# Patient Record
Sex: FEMALE | Race: WHITE | Employment: FULL TIME | URBAN - METROPOLITAN AREA
[De-identification: names, ages, dates, MRNs, and addresses within clinical notes are randomized per-mention and may not be internally consistent; named-entity substitution may affect disease eponyms.]

---

## 2017-05-25 LAB
ANTIBODY SCREEN, EXTERNAL: NORMAL
CHLAMYDIA, EXTERNAL: NORMAL
HBSAG, EXTERNAL: NORMAL
HCT, EXTERNAL: 37.7
HEPATITIS C AB,   EXT: POSITIVE
HGB, EXTERNAL: 13
HIV, EXTERNAL: NORMAL
N. GONORRHEA, EXTERNAL: NORMAL
PLATELET CNT,   EXTERNAL: 238
RUBELLA, EXTERNAL: NORMAL
T. PALLIDUM, EXTERNAL: NORMAL
TYPE, ABO & RH, EXTERNAL: NORMAL
URINALYSIS, EXTERNAL: NORMAL

## 2017-08-16 LAB — CYSTIC FIBROSIS, EXTERNAL: NORMAL

## 2017-09-26 LAB
ANTIBODY SCREEN, EXTERNAL: NORMAL
ANTIBODY SCREEN, EXTERNAL: NORMAL
GTT, 1 HR, GLUCOLA, EXTERNAL: 111
GTT, 1 HR, GLUCOLA, EXTERNAL: 111
HCT, EXTERNAL: 34.2
HGB, EXTERNAL: 11.7
PLATELET CNT,   EXTERNAL: 221

## 2017-11-27 ENCOUNTER — OFFICE VISIT (OUTPATIENT)
Dept: OBGYN CLINIC | Age: 27
End: 2017-11-27

## 2017-11-27 NOTE — PROGRESS NOTES
A user error has taken place: encounter opened in error, closed for administrative reasons. A user error has taken place: encounter opened in error, closed for administrative reasons.

## 2017-11-30 DIAGNOSIS — O09.93 SUPERVISION OF HIGH RISK PREGNANCY IN THIRD TRIMESTER: ICD-10-CM

## 2017-11-30 LAB
AFPT, MATERNAL, EXTERNAL: NORMAL
NT, EXTERNAL: NORMAL

## 2017-12-04 ENCOUNTER — ROUTINE PRENATAL (OUTPATIENT)
Dept: OBGYN CLINIC | Age: 27
End: 2017-12-04

## 2017-12-04 VITALS
WEIGHT: 162.6 LBS | BODY MASS INDEX: 28.81 KG/M2 | SYSTOLIC BLOOD PRESSURE: 104 MMHG | HEIGHT: 63 IN | DIASTOLIC BLOOD PRESSURE: 58 MMHG

## 2017-12-04 DIAGNOSIS — B19.20 HEPATITIS C VIRUS INFECTION WITHOUT HEPATIC COMA, UNSPECIFIED CHRONICITY: ICD-10-CM

## 2017-12-04 DIAGNOSIS — O09.93 SUPERVISION OF HIGH RISK PREGNANCY IN THIRD TRIMESTER: Primary | ICD-10-CM

## 2017-12-04 DIAGNOSIS — Z23 ENCOUNTER FOR IMMUNIZATION: ICD-10-CM

## 2017-12-04 DIAGNOSIS — F19.11 HISTORY OF DRUG ABUSE (HCC): ICD-10-CM

## 2017-12-04 LAB — GRBS, EXTERNAL: NEGATIVE

## 2017-12-04 NOTE — PATIENT INSTRUCTIONS
Week 38 of Your Pregnancy: Care Instructions  Your Care Instructions    Believe it or not, your baby is almost here. You may have ideas about your baby's personality because of how much he or she moves. Or you may have noticed how he or she responds to sounds, warmth, cold, and light. You may even know what kind of music your baby likes. By now, you have a better idea of what to expect during delivery. You may have talked about your birth preferences with your doctor. But even if you want a vaginal birth, it is a good idea to learn about  births.  birth means that your baby is born through a cut (incision) in your lower belly. It is sometimes the best choice for the health of the baby and the mother. This care sheet can help you understand  births. It also gives you information about what to expect after your baby is born. And it helps you understand more about postpartum depression. Follow-up care is a key part of your treatment and safety. Be sure to make and go to all appointments, and call your doctor if you are having problems. It's also a good idea to know your test results and keep a list of the medicines you take. How can you care for yourself at home? Learn about  birth  · Most C-sections are unplanned. They are done because of problems that occur during labor. These problems might include:  ¨ Labor that slows or stops. ¨ High blood pressure or other problems for the mother. ¨ Signs of distress in the baby. These signs may include a very fast or slow heart rate. · Although most mothers and babies do well after , it is major surgery. It has more risks than a vaginal delivery. · In some cases, a planned  may be safer than a vaginal delivery. This may be the case if:  ¨ The mother has a health problem, such as a heart condition. ¨ The baby isn't in a head-down position for delivery. This is called a breech position.   ¨ The uterus has scars from past surgeries. This could increase the chance of a tear in the uterus. ¨ There is a problem with the placenta. ¨ The mother has an infection, such as genital herpes, that could be spread to the baby. ¨ The mother is having twins or more. ¨ The baby weighs 9 to 10 pounds or more. · Because of the risks of , planned C-sections generally should be done only for medical reasons. And a planned  should be done at 39 weeks or later unless there is a medical reason to do it sooner. Know what to expect after delivery, and plan for the first few weeks at home  · You, your baby, and your partner or  will get identification bands. Only people with matching bands can  the baby from the nursery. · You will learn how to feed, diaper, and bathe your baby. And you will learn how to care for the umbilical cord stump. If your baby will be circumcised, you will also learn how to care for that. · Ask people to wait to visit you until you are at home. And ask them to wash their hands before they touch your baby. · Make sure you have another adult in your home for at least 2 or 3 days after the birth. · During the first 2 weeks, limit when friends and family can visit. · Do not allow visitors who have colds or infections. Make sure all visitors are up to date with their vaccinations. Never let anyone smoke around your baby. · Try to nap when the baby naps. Be aware of postpartum depression  · \"Baby blues\" are common for the first 1 to 2 weeks after birth. You may cry or feel sad or irritable for no reason. · For some women, these feelings last longer and are more intense. This is called postpartum depression. · If your symptoms last for more than a few weeks or you feel very depressed, ask your doctor for help. · Postpartum depression can be treated. Support groups and counseling can help. Sometimes medicine can also help. Where can you learn more?   Go to http://martin-andres.info/. Enter B044 in the search box to learn more about \"Week 38 of Your Pregnancy: Care Instructions. \"  Current as of: March 16, 2017  Content Version: 11.4  © 1960-6294 [employer]. Care instructions adapted under license by [employer] (which disclaims liability or warranty for this information). If you have questions about a medical condition or this instruction, always ask your healthcare professional. [address] any warranty or liability for your use of this information.

## 2017-12-04 NOTE — PROGRESS NOTES
Current pregnancy history:    Maged Capellan is a 33 y.o. female who presents for the evaluation of pregnancy. Patient is a 37w5d late transfer. Male fetus   Patient's last menstrual period was 03/15/2017. LMP history:  The date of her LMP is certain. Her last menstrual period was normal and lasted for 4 to 5 days. She was not on the pill at conception. Based on her LMP, her EDC is 17 Her menstrual cycles are regular and occur approximately every 28 days  and range from 3 to 5 days. The last menses lasted the usual number of days. Ultrasound data:  She had an  ultrasound done 2017 which revealed a viable nunez pregnancy with a gestational age of 9 weeks and 6 days giving an [address] of 12/15/17. Records are scanned into patients chart. Pregnancy symptoms:    Since her LMP she has experienced  urinary frequency, breast tenderness, and nausea. She has not been vomiting over the last few weeks. Associated signs and symptoms which she denies: dysuria, discharge, vaginal bleeding. She states she has gained weight:  Approximately 5 pounds over the last few weeks. Relevant past pregnancy history:   She has the following pregnancy history:   Hx of TABx1   She has no history of  delivery. Relevant past medical history:(relevant to this pregnancy): noncontributory. Patient has history of substance abuse, heroin and methamphetamines.   She states she is not on any maintience therapy  She was incarcerated in her first trimester and was transferred to a substance abuse treatment facility  Recently moved back to [facility] and living with family    Problem List  Never Reviewed          Codes Class Noted    Supervision of high risk pregnancy in third trimester ICD-10-CM: O09.93  ICD-9-CM: V23.9  2017    Overview Signed 2017  1:14 PM by April TEJ Bailey     Late transfer  Rh neg-rhogam received 17  Hep C +  Drug abuse-heroin and meth  Declined genetic testing, was too late for tetra  JEREMY consistent w/ LMP                     Pap/Occupational history:  Last pap smear: last year Results: Normal      Her occupation is: not employed. Substance history: negative for alcohol, tobacco and street drugs. Positive for nothing. Exposure history: There are indoor cat/s in the home. The patient was instructed to not change the cat litter. She denies close contact with children on a regular basis. She has had chicken pox or the vaccine in the past.   Patient denies issues with domestic violence. Genetic Screening/Teratology Counseling: (Includes patient, baby's father, or anyone in either family with:)  3.  Patient's age >/= 28 at [facility]?-- no  .   2. Thalassemia ([facility] [place] [address], or  background): MCV<80?--no.     3.  Neural tube defect (meningomyelocele, spina bifida, anencephaly)?--no.   4.  Congenital heart defect?--no.  5.  Down syndrome?--no.   6.  Neel-Sachs (Catholic, [employer])?--no.   7.  Canavan's Disease?--no.   8.  Familial Dysautonomia?--no.   9.  Sickle cell disease or trait ()? --no   The patient has not been tested for sickle trait  10. Hemophilia or other blood disorders?--no. 11.  Muscular dystrophy?--no. 12.  Cystic fibrosis?--no. 13.  Myke's Chorea?--no. 14.  Mental retardation/autism (if yes was person tested for Fragile X)?--no. 15.  Other inherited genetic or chromosomal disorder?--no. 12.  Maternal metabolic disorder (DM, PKU, etc)?--no. 17.  Patient or FOB with a child with a birth defect not listed above?--no.  17a. Patient or FOB with a birth defect themselves?--no. 18.  Recurrent pregnancy loss, or stillbirth?--no. 19.  Any medications since LMP other than prenatal vitamins (include vitamins,  supplements, OTC meds, drugs, alcohol)?--no. 20.  Any other genetic/environmental exposure to discuss?--no. Infection History:  1.   Lives with someone with TB or TB exposed?--no.   2.  Patient or partner has history of genital herpes?--no.  3.  Rash or viral illness since LMP?--no.    4.  History of STD (GC, CT, HPV, syphilis, HIV)? --no   5. Other: OTHER? Obstetric History       T0      L0     SAB0   TAB1   Ectopic0   Molar0   Multiple0   Live Births0       # Outcome Date GA Lbr Robert/2nd Weight Sex Delivery Anes PTL Lv   2 Current            1 TAB                     Past Medical History:   Diagnosis Date    Constipation     GERD (gastroesophageal reflux disease)     Hepatitis C     Intravenous drug abuse complicating pregnancy     Heroin and Meth     No past surgical history on file. Social History     Occupational History    Not on file.      Social History Main Topics    Smoking status: Former Smoker    Smokeless tobacco: Never Used    Alcohol use Not on file    Drug use: Yes     Special: Methamphetamines, Heroin    Sexual activity: Yes     Partners: Male     Birth control/ protection: None     Family History   Problem Relation Age of Onset    Colon Cancer Father        Not on File  Prior to Admission medications    Not on File        Review of Systems: History obtained from the patient  Constitutional: negative for weight loss, fever, night sweats  HEENT: negative for hearing loss, earache, congestion, snoring, sorethroat  CV: negative for chest pain, palpitations, edema  Resp: negative for cough, shortness of breath, wheezing  Breast: negative for breast lumps, nipple discharge, galactorrhea  GI: negative for change in bowel habits, abdominal pain, black or bloody stools  : negative for frequency, dysuria, hematuria, vaginal discharge  MSK: negative for back pain, joint pain, muscle pain  Skin: negative for itching, rash, hives  Neuro: negative for dizziness, headache, confusion, weakness  Psych: negative for anxiety, depression, change in mood  Heme/lymph: negative for bleeding, bruising, pallor    Objective:  Visit Vitals    /58    Ht 5' 3\" (1.6 m)    Wt 162 lb 9.6 oz (73.8 kg)    LMP 03/15/2017    BMI 28.8 kg/m2       Physical Exam:   PHYSICAL EXAMINATION    Constitutional  · Appearance: well-nourished, well developed, alert, in no acute distress    HENT  · Head  · Face: appears normal  · Eyes: appear normal  · Ears: normal  · Mouth: normal  · Lips: no lesions    Neck  · Inspection/Palpation: normal appearance, no masses or tenderness  · Lymph Nodes: no lymphadenopathy present  · Thyroid: gland size normal, nontender, no nodules or masses present on palpation    Chest  · Respiratory Effort: breathing unlabored  · Auscultation: normal breath sounds    Cardiovascular  · Heart:  · Auscultation: regular rate and rhythm without murmur    Breasts  · Inspection of Breasts: breasts symmetrical, no skin changes, no discharge present, nipple appearance normal, no skin retraction present  · Palpation of Breasts and Axillae: no masses present on palpation, no breast tenderness  · Axillary Lymph Nodes: no lymphadenopathy present    Gastrointestinal  · Abdominal Examination: abdomen non-tender to palpation, normal bowel sounds, no masses present  · Liver and spleen: no hepatomegaly present, spleen not palpable  · Hernias: no hernias identified    Genitourinary  · External Genitalia: normal appearance for age, no discharge present, no tenderness present, no inflammatory lesions present, no masses present, no atrophy present  · Vagina: normal vaginal vault without central or paravaginal defects, no discharge present, no inflammatory lesions present, no masses present  · Bladder: non-tender to palpation  · Urethra: appears normal  · Cervix: normal   · Uterus: enlarged, normal shape, soft  · Adnexa: no adnexal tenderness present, no adnexal masses present  · Perineum: perineum within normal limits, no evidence of trauma, no rashes or skin lesions present  · Anus: anus within normal limits, no hemorrhoids present  · Inguinal Lymph Nodes: no lymphadenopathy present    Skin  · General Inspection: no rash, no lesions identified    Neurologic/Psychiatric  · Mental Status:  · Orientation: grossly oriented to person, place and time  · Mood and Affect: mood normal, affect appropriate    Assessment:   Intrauterine pregnancy with the following problems identified:   ED 2018 by D=US  \"Anel\"  Transfer 37 weeks  Hx of heroine and met abuse - s/p penitentiary/rehab. No maintenance currently  Hep C pos  Declined genetic testing per records  Flu vaccine 2018  Rh neg - had rhogam at prior MD office      Plan:     Offered CF testing, CVS, Nuchal Translucency, MSAFP, amnio, and discussed NIPT  Course of pregnancy discussed including visit schedule, routine U/S, glucola testing, etc.  Avoid alcoholic beverages and illicit/recreational drugs use  Take prenatal vitamins or folic acid daily. Hospital and practice style discussed with coverage system. Discussed nutrition, toxoplasmosis precautions, sexual activity, exercise, need for influenza vaccine, environmental and work hazards, travel advice, screen for domestic violence, need for seat belts. Discussed seafood, unpasteurized dairy products, deli meat, artificial sweeteners, and caffeine. Information on prenatal classes/breastfeeding given. Information on circumcision given  Patient encouraged not to smoke. Discussed current prescription drug use. Given medication list.  Discussed the use of over the counter medications and chemicals. Route of delivery discussed, including risks, benefits, and alternatives of  versus repeat LTCS. Pt understands risk of hemorrhage during pregnancy and post delivery and would accept blood products if necessary in life-threatening emergencies  Urine drug screen, US for growth, refer to hepatology - check comp met for liver function, RNA quant  GBS sent  Disc labor prec      Handouts given to pt.

## 2017-12-04 NOTE — PROGRESS NOTES
Problem List  Never Reviewed          Codes Class Noted    Supervision of high risk pregnancy in third trimester ICD-10-CM: O09.93  ICD-9-CM: V23.9  11/30/2017    Overview Signed 11/30/2017  1:14 PM by Jahaira Bailey     Late transfer  Rh neg-rhogam received 9/26/17  Hep C +  Drug abuse-heroin and meth  Declined genetic testing, was too late for tetra  JEREMY consistent w/ LMP

## 2017-12-05 LAB
ALBUMIN SERPL-MCNC: 3.7 G/DL (ref 3.5–5.5)
ALBUMIN/GLOB SERPL: 1.3 {RATIO} (ref 1.2–2.2)
ALP SERPL-CCNC: 126 IU/L (ref 39–117)
ALT SERPL-CCNC: 42 IU/L (ref 0–32)
AMPHETAMINES UR QL SCN: NEGATIVE NG/ML
AST SERPL-CCNC: 32 IU/L (ref 0–40)
BARBITURATES UR QL SCN: NEGATIVE NG/ML
BENZODIAZ UR QL: NEGATIVE NG/ML
BILIRUB SERPL-MCNC: 0.2 MG/DL (ref 0–1.2)
BUN SERPL-MCNC: 7 MG/DL (ref 6–20)
BUN/CREAT SERPL: 11 (ref 9–23)
BZE UR QL: NEGATIVE NG/ML
CALCIUM SERPL-MCNC: 8.6 MG/DL (ref 8.7–10.2)
CANNABINOIDS UR QL SCN: NEGATIVE NG/ML
CHLORIDE SERPL-SCNC: 104 MMOL/L (ref 96–106)
CO2 SERPL-SCNC: 22 MMOL/L (ref 18–29)
CREAT SERPL-MCNC: 0.64 MG/DL (ref 0.57–1)
GFR SERPLBLD CREATININE-BSD FMLA CKD-EPI: 124 ML/MIN/1.73
GFR SERPLBLD CREATININE-BSD FMLA CKD-EPI: 142 ML/MIN/1.73
GLOBULIN SER CALC-MCNC: 2.8 G/DL (ref 1.5–4.5)
GLUCOSE SERPL-MCNC: 88 MG/DL (ref 65–99)
HCV RNA SERPL NAA+PROBE-ACNC: NORMAL IU/ML
HCV RNA SERPL NAA+PROBE-LOG IU: 5.86 LOG10 IU/ML
MDMA UR QL SCN: NEGATIVE NG/ML
METHADONE UR QL SCN: NEGATIVE NG/ML
METHAQUALONE UR QL: NEGATIVE NG/ML
OPIATES UR QL: NEGATIVE NG/ML
PCP UR QL: NEGATIVE NG/ML
POTASSIUM SERPL-SCNC: 4.6 MMOL/L (ref 3.5–5.2)
PROPOXYPH UR QL: NEGATIVE NG/ML
PROT SERPL-MCNC: 6.5 G/DL (ref 6–8.5)
SODIUM SERPL-SCNC: 142 MMOL/L (ref 134–144)
TEST INFORMATION: NORMAL

## 2017-12-06 ENCOUNTER — TELEPHONE (OUTPATIENT)
Dept: OBGYN CLINIC | Age: 27
End: 2017-12-06

## 2017-12-06 DIAGNOSIS — O09.93 SUPERVISION OF HIGH RISK PREGNANCY IN THIRD TRIMESTER: ICD-10-CM

## 2017-12-06 NOTE — TELEPHONE ENCOUNTER
Called and left detailed message on voicemail that I could not remember if we went over this info when she was just here, but that she is far along enough she needs to go online or call the [phone] and register for the hospital.  I also advised patient she could still sign up for classes through the hospital that way.

## 2017-12-07 ENCOUNTER — ROUTINE PRENATAL (OUTPATIENT)
Dept: OBGYN CLINIC | Age: 27
End: 2017-12-07

## 2017-12-07 VITALS — DIASTOLIC BLOOD PRESSURE: 62 MMHG | WEIGHT: 163.4 LBS | BODY MASS INDEX: 28.95 KG/M2 | SYSTOLIC BLOOD PRESSURE: 102 MMHG

## 2017-12-07 DIAGNOSIS — O09.93 SUPERVISION OF HIGH RISK PREGNANCY IN THIRD TRIMESTER: Primary | ICD-10-CM

## 2017-12-07 DIAGNOSIS — O40.3XX0 POLYHYDRAMNIOS AFFECTING PREGNANCY IN THIRD TRIMESTER: ICD-10-CM

## 2017-12-07 DIAGNOSIS — B18.2 CHRONIC HEPATITIS C WITHOUT HEPATIC COMA (HCC): ICD-10-CM

## 2017-12-07 DIAGNOSIS — F19.11 HISTORY OF DRUG ABUSE (HCC): ICD-10-CM

## 2017-12-07 LAB — GP B STREP DNA SPEC QL NAA+PROBE: NEGATIVE

## 2017-12-07 NOTE — PROGRESS NOTES
Baby moving. US today 64%tile, HARSHIL 31cm. Hep C titer elevated, one abnl LFT - will repeat next week at regular visit. Has appt with liver specialist in March - first avail appt.   See MFM tomorrow

## 2017-12-07 NOTE — PROGRESS NOTES
LIMITED OB SCAN  A SINGLE VERTEX 38W1D IUP IS SEEN. FETAL CARDIAC MOTION OBSERVED. LIMITED ANATOMY WAS VISUALIZED AND APPEARS WNL. APPROPRIATE FETAL GROWTH IS SEEN. SIZE = DATES. PLACENTA APPEARS WNL. HARSHIL APPEARS INCREASED AND MEASURES 31CM.

## 2017-12-08 ENCOUNTER — HOSPITAL ENCOUNTER (OUTPATIENT)
Dept: PERINATAL CARE | Age: 27
Discharge: HOME | End: 2017-12-08
Attending: OBSTETRICS & GYNECOLOGY
Payer: COMMERCIAL

## 2017-12-08 ENCOUNTER — ANESTHESIA EVENT (OUTPATIENT)
Dept: LABOR AND DELIVERY | Age: 27
End: 2017-12-08
Payer: COMMERCIAL

## 2017-12-08 ENCOUNTER — ANESTHESIA (OUTPATIENT)
Dept: LABOR AND DELIVERY | Age: 27
End: 2017-12-08
Payer: COMMERCIAL

## 2017-12-08 ENCOUNTER — HOSPITAL ENCOUNTER (INPATIENT)
Age: 27
LOS: 3 days | Discharge: HOME | End: 2017-12-11
Attending: OBSTETRICS & GYNECOLOGY | Admitting: OBSTETRICS & GYNECOLOGY
Payer: COMMERCIAL

## 2017-12-08 PROBLEM — Z34.90 PREGNANCY: Status: ACTIVE | Noted: 2017-12-08

## 2017-12-08 LAB
AMPHET UR QL SCN: NEGATIVE
BARBITURATES UR QL SCN: NEGATIVE
BASOPHILS # BLD: 0 K/UL (ref 0–0.1)
BASOPHILS NFR BLD: 0 % (ref 0–1)
BENZODIAZ UR QL: NEGATIVE
CANNABINOIDS UR QL SCN: NEGATIVE
COCAINE UR QL SCN: NEGATIVE
DRUG SCRN COMMENT,DRGCM: NORMAL
EOSINOPHIL # BLD: 0.1 K/UL (ref 0–0.4)
EOSINOPHIL NFR BLD: 2 % (ref 0–7)
ERYTHROCYTE [DISTWIDTH] IN BLOOD BY AUTOMATED COUNT: 12.5 % (ref 11.5–14.5)
HCT VFR BLD AUTO: 35.8 % (ref 35–47)
HGB BLD-MCNC: 12.6 G/DL (ref 11.5–16)
LYMPHOCYTES # BLD: 2.3 K/UL (ref 0.8–3.5)
LYMPHOCYTES NFR BLD: 32 % (ref 12–49)
MCH RBC QN AUTO: 33.2 PG (ref 26–34)
MCHC RBC AUTO-ENTMCNC: 35.2 G/DL (ref 30–36.5)
MCV RBC AUTO: 94.5 FL (ref 80–99)
METHADONE UR QL: NEGATIVE
MONOCYTES # BLD: 0.6 K/UL (ref 0–1)
MONOCYTES NFR BLD: 8 % (ref 5–13)
NEUTS SEG # BLD: 4.3 K/UL (ref 1.8–8)
NEUTS SEG NFR BLD: 58 % (ref 32–75)
OPIATES UR QL: NEGATIVE
PCP UR QL: NEGATIVE
PLATELET # BLD AUTO: 143 K/UL (ref 150–400)
RBC # BLD AUTO: 3.79 M/UL (ref 3.8–5.2)
WBC # BLD AUTO: 7.4 K/UL (ref 3.6–11)

## 2017-12-08 PROCEDURE — 74011250636 HC RX REV CODE- 250/636: Performed by: ANESTHESIOLOGY

## 2017-12-08 PROCEDURE — 59025 FETAL NON-STRESS TEST: CPT

## 2017-12-08 PROCEDURE — 51702 INSERT TEMP BLADDER CATH: CPT

## 2017-12-08 PROCEDURE — 99285 EMERGENCY DEPT VISIT HI MDM: CPT

## 2017-12-08 PROCEDURE — 76010000392 HC C SECN EA ADDL 0.5 HR: Performed by: OBSTETRICS & GYNECOLOGY

## 2017-12-08 PROCEDURE — 85025 COMPLETE CBC W/AUTO DIFF WBC: CPT | Performed by: OBSTETRICS & GYNECOLOGY

## 2017-12-08 PROCEDURE — 75410000003 HC RECOV DEL/VAG/CSECN EA 0.5 HR: Performed by: OBSTETRICS & GYNECOLOGY

## 2017-12-08 PROCEDURE — 76010000391 HC C SECN FIRST 1 HR: Performed by: OBSTETRICS & GYNECOLOGY

## 2017-12-08 PROCEDURE — 76805 OB US >/= 14 WKS SNGL FETUS: CPT | Performed by: OBSTETRICS & GYNECOLOGY

## 2017-12-08 PROCEDURE — 74011000250 HC RX REV CODE- 250

## 2017-12-08 PROCEDURE — 76060000078 HC EPIDURAL ANESTHESIA: Performed by: OBSTETRICS & GYNECOLOGY

## 2017-12-08 PROCEDURE — 77030032060 HC PWDR HEMSTAT ARISTA ASRB 3GM BARD -C

## 2017-12-08 PROCEDURE — 77030007866 HC KT SPN ANES BBMI -B: Performed by: ANESTHESIOLOGY

## 2017-12-08 PROCEDURE — 74011250636 HC RX REV CODE- 250/636

## 2017-12-08 PROCEDURE — 36415 COLL VENOUS BLD VENIPUNCTURE: CPT | Performed by: OBSTETRICS & GYNECOLOGY

## 2017-12-08 PROCEDURE — 99218 HC RM OBSERVATION: CPT

## 2017-12-08 PROCEDURE — 75410000002 HC LABOR FEE PER 1 HR: Performed by: OBSTETRICS & GYNECOLOGY

## 2017-12-08 PROCEDURE — 74011250636 HC RX REV CODE- 250/636: Performed by: OBSTETRICS & GYNECOLOGY

## 2017-12-08 PROCEDURE — 65270000029 HC RM PRIVATE

## 2017-12-08 PROCEDURE — 80307 DRUG TEST PRSMV CHEM ANLYZR: CPT | Performed by: OBSTETRICS & GYNECOLOGY

## 2017-12-08 RX ORDER — SIMETHICONE 80 MG
80 TABLET,CHEWABLE ORAL
Status: DISCONTINUED | OUTPATIENT
Start: 2017-12-08 | End: 2017-12-11 | Stop reason: HOSPADM

## 2017-12-08 RX ORDER — SODIUM CHLORIDE 0.9 % (FLUSH) 0.9 %
5-10 SYRINGE (ML) INJECTION EVERY 8 HOURS
Status: DISCONTINUED | OUTPATIENT
Start: 2017-12-08 | End: 2017-12-11 | Stop reason: HOSPADM

## 2017-12-08 RX ORDER — ZOLPIDEM TARTRATE 5 MG/1
5 TABLET ORAL
Status: DISCONTINUED | OUTPATIENT
Start: 2017-12-08 | End: 2017-12-11 | Stop reason: HOSPADM

## 2017-12-08 RX ORDER — SODIUM CHLORIDE, SODIUM LACTATE, POTASSIUM CHLORIDE, CALCIUM CHLORIDE 600; 310; 30; 20 MG/100ML; MG/100ML; MG/100ML; MG/100ML
100 INJECTION, SOLUTION INTRAVENOUS CONTINUOUS
Status: DISCONTINUED | OUTPATIENT
Start: 2017-12-08 | End: 2017-12-08

## 2017-12-08 RX ORDER — CEFAZOLIN SODIUM IN 0.9 % NACL 2 G/50 ML
2 INTRAVENOUS SOLUTION, PIGGYBACK (ML) INTRAVENOUS ONCE
Status: COMPLETED | OUTPATIENT
Start: 2017-12-08 | End: 2017-12-08

## 2017-12-08 RX ORDER — SODIUM CHLORIDE 0.9 % (FLUSH) 0.9 %
5-10 SYRINGE (ML) INJECTION EVERY 8 HOURS
Status: DISCONTINUED | OUTPATIENT
Start: 2017-12-09 | End: 2017-12-11 | Stop reason: HOSPADM

## 2017-12-08 RX ORDER — SODIUM CHLORIDE 0.9 % (FLUSH) 0.9 %
5-10 SYRINGE (ML) INJECTION AS NEEDED
Status: DISCONTINUED | OUTPATIENT
Start: 2017-12-08 | End: 2017-12-08

## 2017-12-08 RX ORDER — MORPHINE SULFATE 2 MG/ML
INJECTION, SOLUTION INTRAMUSCULAR; INTRAVENOUS AS NEEDED
Status: DISCONTINUED | OUTPATIENT
Start: 2017-12-08 | End: 2017-12-08 | Stop reason: HOSPADM

## 2017-12-08 RX ORDER — NALBUPHINE HYDROCHLORIDE 10 MG/ML
5 INJECTION, SOLUTION INTRAMUSCULAR; INTRAVENOUS; SUBCUTANEOUS
Status: DISCONTINUED | OUTPATIENT
Start: 2017-12-08 | End: 2017-12-08

## 2017-12-08 RX ORDER — KETOROLAC TROMETHAMINE 30 MG/ML
30 INJECTION, SOLUTION INTRAMUSCULAR; INTRAVENOUS
Status: DISPENSED | OUTPATIENT
Start: 2017-12-08 | End: 2017-12-09

## 2017-12-08 RX ORDER — BUPIVACAINE HYDROCHLORIDE 7.5 MG/ML
INJECTION, SOLUTION EPIDURAL; RETROBULBAR AS NEEDED
Status: DISCONTINUED | OUTPATIENT
Start: 2017-12-08 | End: 2017-12-08 | Stop reason: HOSPADM

## 2017-12-08 RX ORDER — SODIUM CHLORIDE, SODIUM LACTATE, POTASSIUM CHLORIDE, CALCIUM CHLORIDE 600; 310; 30; 20 MG/100ML; MG/100ML; MG/100ML; MG/100ML
100 INJECTION, SOLUTION INTRAVENOUS CONTINUOUS
Status: DISCONTINUED | OUTPATIENT
Start: 2017-12-09 | End: 2017-12-11 | Stop reason: HOSPADM

## 2017-12-08 RX ORDER — KETOROLAC TROMETHAMINE 30 MG/ML
30 INJECTION, SOLUTION INTRAMUSCULAR; INTRAVENOUS
Status: DISCONTINUED | OUTPATIENT
Start: 2017-12-08 | End: 2017-12-08

## 2017-12-08 RX ORDER — MORPHINE SULFATE 0.5 MG/ML
INJECTION, SOLUTION EPIDURAL; INTRATHECAL; INTRAVENOUS AS NEEDED
Status: DISCONTINUED | OUTPATIENT
Start: 2017-12-08 | End: 2017-12-08 | Stop reason: HOSPADM

## 2017-12-08 RX ORDER — SODIUM CHLORIDE, SODIUM LACTATE, POTASSIUM CHLORIDE, CALCIUM CHLORIDE 600; 310; 30; 20 MG/100ML; MG/100ML; MG/100ML; MG/100ML
125 INJECTION, SOLUTION INTRAVENOUS CONTINUOUS
Status: DISCONTINUED | OUTPATIENT
Start: 2017-12-08 | End: 2017-12-11 | Stop reason: HOSPADM

## 2017-12-08 RX ORDER — OXYTOCIN 10 [USP'U]/ML
INJECTION, SOLUTION INTRAMUSCULAR; INTRAVENOUS AS NEEDED
Status: DISCONTINUED | OUTPATIENT
Start: 2017-12-08 | End: 2017-12-08 | Stop reason: HOSPADM

## 2017-12-08 RX ORDER — SODIUM CHLORIDE, SODIUM LACTATE, POTASSIUM CHLORIDE, CALCIUM CHLORIDE 600; 310; 30; 20 MG/100ML; MG/100ML; MG/100ML; MG/100ML
125 INJECTION, SOLUTION INTRAVENOUS CONTINUOUS
Status: DISCONTINUED | OUTPATIENT
Start: 2017-12-08 | End: 2017-12-08

## 2017-12-08 RX ORDER — NALOXONE HYDROCHLORIDE 0.4 MG/ML
0.4 INJECTION, SOLUTION INTRAMUSCULAR; INTRAVENOUS; SUBCUTANEOUS AS NEEDED
Status: DISCONTINUED | OUTPATIENT
Start: 2017-12-08 | End: 2017-12-11 | Stop reason: HOSPADM

## 2017-12-08 RX ORDER — OXYTOCIN/RINGER'S LACTATE 20/1000 ML
125-500 PLASTIC BAG, INJECTION (ML) INTRAVENOUS ONCE
Status: ACTIVE | OUTPATIENT
Start: 2017-12-08 | End: 2017-12-09

## 2017-12-08 RX ORDER — SODIUM CHLORIDE 0.9 % (FLUSH) 0.9 %
5-10 SYRINGE (ML) INJECTION AS NEEDED
Status: DISCONTINUED | OUTPATIENT
Start: 2017-12-08 | End: 2017-12-11 | Stop reason: HOSPADM

## 2017-12-08 RX ORDER — HYDROCODONE BITARTRATE AND ACETAMINOPHEN 7.5; 325 MG/1; MG/1
1 TABLET ORAL
Status: DISCONTINUED | OUTPATIENT
Start: 2017-12-08 | End: 2017-12-11 | Stop reason: HOSPADM

## 2017-12-08 RX ORDER — ZOLPIDEM TARTRATE 5 MG/1
5 TABLET ORAL
Status: DISCONTINUED | OUTPATIENT
Start: 2017-12-08 | End: 2017-12-08

## 2017-12-08 RX ORDER — SODIUM CHLORIDE 0.9 % (FLUSH) 0.9 %
5-10 SYRINGE (ML) INJECTION EVERY 8 HOURS
Status: DISCONTINUED | OUTPATIENT
Start: 2017-12-08 | End: 2017-12-08

## 2017-12-08 RX ORDER — NALBUPHINE HYDROCHLORIDE 10 MG/ML
5 INJECTION, SOLUTION INTRAMUSCULAR; INTRAVENOUS; SUBCUTANEOUS
Status: ACTIVE | OUTPATIENT
Start: 2017-12-08 | End: 2017-12-09

## 2017-12-08 RX ORDER — ONDANSETRON 2 MG/ML
INJECTION INTRAMUSCULAR; INTRAVENOUS AS NEEDED
Status: DISCONTINUED | OUTPATIENT
Start: 2017-12-08 | End: 2017-12-08 | Stop reason: HOSPADM

## 2017-12-08 RX ORDER — IBUPROFEN 600 MG/1
600 TABLET ORAL
Status: DISCONTINUED | OUTPATIENT
Start: 2017-12-08 | End: 2017-12-11 | Stop reason: HOSPADM

## 2017-12-08 RX ORDER — HYDROMORPHONE HYDROCHLORIDE 1 MG/ML
1 INJECTION, SOLUTION INTRAMUSCULAR; INTRAVENOUS; SUBCUTANEOUS
Status: DISCONTINUED | OUTPATIENT
Start: 2017-12-08 | End: 2017-12-11 | Stop reason: HOSPADM

## 2017-12-08 RX ADMIN — CEFAZOLIN 2 G: 1 INJECTION, POWDER, FOR SOLUTION INTRAMUSCULAR; INTRAVENOUS; PARENTERAL at 18:53

## 2017-12-08 RX ADMIN — MORPHINE SULFATE 250 MCG: 0.5 INJECTION, SOLUTION EPIDURAL; INTRATHECAL; INTRAVENOUS at 18:48

## 2017-12-08 RX ADMIN — ONDANSETRON 4 MG: 2 INJECTION INTRAMUSCULAR; INTRAVENOUS at 19:08

## 2017-12-08 RX ADMIN — OXYTOCIN 20 UNITS: 10 INJECTION, SOLUTION INTRAMUSCULAR; INTRAVENOUS at 19:46

## 2017-12-08 RX ADMIN — OXYTOCIN 40 UNITS: 10 INJECTION, SOLUTION INTRAMUSCULAR; INTRAVENOUS at 19:08

## 2017-12-08 RX ADMIN — SODIUM CHLORIDE, POTASSIUM CHLORIDE, SODIUM LACTATE AND CALCIUM CHLORIDE: 600; 310; 30; 20 INJECTION, SOLUTION INTRAVENOUS at 19:46

## 2017-12-08 RX ADMIN — SODIUM CHLORIDE, POTASSIUM CHLORIDE, SODIUM LACTATE AND CALCIUM CHLORIDE: 600; 310; 30; 20 INJECTION, SOLUTION INTRAVENOUS at 19:08

## 2017-12-08 RX ADMIN — SODIUM CHLORIDE, POTASSIUM CHLORIDE, SODIUM LACTATE AND CALCIUM CHLORIDE: 600; 310; 30; 20 INJECTION, SOLUTION INTRAVENOUS at 18:42

## 2017-12-08 RX ADMIN — KETOROLAC TROMETHAMINE 30 MG: 30 INJECTION, SOLUTION INTRAMUSCULAR at 23:59

## 2017-12-08 RX ADMIN — MORPHINE SULFATE 2 MG: 2 INJECTION, SOLUTION INTRAMUSCULAR; INTRAVENOUS at 19:10

## 2017-12-08 RX ADMIN — BUPIVACAINE HYDROCHLORIDE 1.7 ML: 7.5 INJECTION, SOLUTION EPIDURAL; RETROBULBAR at 18:48

## 2017-12-08 NOTE — IP AVS SNAPSHOT
[address] 
[phone] Patient: Tim Mann MRN: [id number] :1990 My Medications TAKE these medications as instructed Instructions Each Dose to Equal  
 Morning Noon Evening Bedtime  
 ibuprofen 800 mg tablet Commonly known as:  MOTRIN Your last dose was: Your next dose is: Take 1 Tab by mouth every six (6) hours as needed for Pain. 800 mg  
    
   
   
   
  
 PNV No12-Iron-FA-DSS-OM-3 29 mg iron-1 mg -50 mg Cpkd Your last dose was: Your next dose is: Take  by mouth. Where to Get Your Medications These medications were sent to [facility]/pharmacy #[id number]- [address] AT [place] [address] [place] [id number] Phone:  [phone]  
  ibuprofen 800 mg tablet

## 2017-12-08 NOTE — ANESTHESIA PREPROCEDURE EVALUATION
Anesthetic History   No history of anesthetic complications            Review of Systems / Medical History  Patient summary reviewed and pertinent labs reviewed    Pulmonary  Within defined limits                 Neuro/Psych   Within defined limits           Cardiovascular                  Exercise tolerance: >4 METS     GI/Hepatic/Renal     GERD: well controlled      Liver disease    Comments: Hep C Endo/Other  Within defined limits           Other Findings              Physical Exam    Airway  Mallampati: II  TM Distance: 4 - 6 cm  Neck ROM: normal range of motion        Cardiovascular  Regular rate and rhythm,  S1 and S2 normal,  no murmur, click, rub, or gallop  Rhythm: regular  Rate: normal         Dental  No notable dental hx       Pulmonary                 Abdominal         Other Findings            Anesthetic Plan    ASA: 2  Anesthesia type: spinal      Post-op pain plan if not by surgeon: intrathecal opiates      Anesthetic plan and risks discussed with: Patient

## 2017-12-08 NOTE — PROGRESS NOTES
1611 Patient arrived to unit from [facility]. Patient denies felling contractions or pain, SOB, chest pain, headaches, N/V, visual disturbance, epigastric pain, vaginal bleeding or discharge. Will continue to monitor. 56 Dr Randy Person at the bedside assessing patient. SVE closed cervix. MD discussed plan of care with patient. Agreed to eat dinner and monitor over night. Received orders for IV, saline lock, lab draw and continuous monitoring. 1754 Deceleration noted on EFM. Dr Randy Person enter Call . Patient agree. Will continue to monitor. Patient repositioned to right side lying. 1905 Bedside and Verbal shift change report given to Miriam Crews RN (oncoming nurse) by Jenny Barger RN (offgoing nurse). Report included the following information SBAR, Kardex and MAR.

## 2017-12-08 NOTE — H&P
History & Physical    Name: Unique Bautista MRN: [id number]  SSN: [id number]    YOB: 1990  Age: 33 y.o. Sex: female        Subjective:     Estimated Date of Delivery: 17  OB History      Para Term  AB Living    2 0   1 0    SAB TAB Ectopic Molar Multiple Live Births     1    0          MsIsabel Berrios is admitted with pregnancy at 38w2d for  Section. Prenatal course was complicated by suspicious fetal surveillance. She had 8/10 BPP today but with late decels on NST. Transferred to L&D and prolonged monitoring shows again prolonged late decels. Please see prenatal records for details. Past Medical History:   Diagnosis Date    Constipation     GERD (gastroesophageal reflux disease)     Hepatitis C     Intravenous drug abuse complicating pregnancy     Heroin and Meth     No past surgical history on file.  @  Social History     Social History    Marital status: SINGLE     Spouse name: N/A    Number of children: N/A    Years of education: N/A     Occupational History    Not on file. Social History Main Topics    Smoking status: Former Smoker    Smokeless tobacco: Never Used    Alcohol use Not on file    Drug use: Yes     Special: Methamphetamines, Heroin    Sexual activity: Yes     Partners: Male     Birth control/ protection: None     Other Topics Concern    Not on file     Social History Narrative     Family History   Problem Relation Age of Onset    Colon Cancer Father        No Known Allergies  Prior to Admission medications    Medication Sig Start Date End Date Taking? Authorizing Provider   PNV No12-Iron-FA-DSS-OM-3 29 mg iron-1 mg -50 mg CPKD Take  by mouth. Yes Historical Provider        Review of Systems: A comprehensive review of systems was negative except for that written in the HPI.     Objective:     Vitals:  Vitals:    17 1644   BP: 113/72   Pulse: 86   Resp: 16   Temp: 98.2 °F (36.8 °C)        Physical Exam:  Chest: clear  Heart: RRR  Abdomen: Gravid  Closed/Thick/High  Membranes:  Intact  Fetal Heart Rate: Late deceleration     Prenatal Labs:   Lab Results   Component Value Date/Time    Rubella, External immune 2017    GrBStrep, External Negative 2017    HBsAg, External neg 2017    HIV, External neg 2017    Gonorrhea, External neg 2017    Chlamydia, External neg 2017        Assessment/Plan:     Plan: Admit for  Section. With suspicious surveillance and a very unfavorable cervix will proceed with c section. Pt agrees. See prenatal labs.     Signed By:  Dameon Carpio MD     2017

## 2017-12-08 NOTE — ANESTHESIA PROCEDURE NOTES
Spinal Block    Start time: 12/8/2017 6:45 PM  End time: 12/8/2017 6:48 PM  Performed by: Desiree Choudhury  Authorized by: Desiree Choudhury     Pre-procedure:   Indications: at surgeon's request and primary anesthetic  Preanesthetic Checklist: patient identified, risks and benefits discussed, anesthesia consent and timeout performed    Timeout Time: 18:45          Spinal Block:   Patient Position:  Seated  Prep Region:  Lumbar  Prep: chlorhexidine      Location:  L3-4  Technique:  Single shot        Needle:   Needle Type:  Pencan  Needle Gauge:  25 G  Attempts:  1      Events: CSF confirmed, no blood with aspiration and no paresthesia        Assessment:  Insertion:  Uncomplicated  Patient tolerance:  Patient tolerated the procedure well with no immediate complications

## 2017-12-08 NOTE — IP AVS SNAPSHOT
Jason Idol 
 
 
 [address] [facility] 
[phone] Patient: Agueda Reed MRN: [id number] :1990 About your hospitalization You were admitted on:  2017 You last received care in the:  [facility] You were discharged on:  2017 Why you were hospitalized Your primary diagnosis was:  Not on File Your diagnoses also included:  Pregnancy Things You Need To Do (next 8 weeks) Follow up with Darryle Aquas, MD  
  
Phone:  [phone] Where:  [address], [place] [address], [address] Thursday Dec 14, 2017 ESTABLISHED PATIENT with Darryle Aquas, MD at  1:40 PM  
Where: [place] ([address]) Discharge Orders None A check louis indicates which time of day the medication should be taken. My Medications TAKE these medications as instructed Instructions Each Dose to Equal  
 Morning Noon Evening Bedtime  
 ibuprofen 800 mg tablet Commonly known as:  MOTRIN Your last dose was: Your next dose is: Take 1 Tab by mouth every six (6) hours as needed for Pain. 800 mg  
    
   
   
   
  
 PNV No12-Iron-FA-DSS-OM-3 29 mg iron-1 mg -50 mg Cpkd Your last dose was: Your next dose is: Take  by mouth. Where to Get Your Medications These medications were sent to [facility]/pharmacy #[id number]- [address] AT [address], [address] [place] [address] Phone:  [phone]  
  ibuprofen 800 mg tablet Discharge Instructions POST DELIVERY DISCHARGE INSTRUCTIONS Name: Agueda Reed YOB: 1990 Primary Diagnosis: Active Problems: 
  Pregnancy (2017) General:  
 
Diet/Diet Restrictions: Eight 8-ounce glasses of fluid daily (water, juices); avoid excessive caffeine intake. Meals/snacks as desired which are high in fiber and carbohydrates and low in fat and cholesterol. Physical Activity / Restrictions / Safety:  
 
Avoid heavy lifting, no more that 8 lbs. For 2-3 weeks; No driving while taking narcotic pain medication. Post  patients should not drive until pain free. No intercourse 4-6 weeks, no douching or tampon use. May resume exercise in 6 weeks. Discharge Instructions/Special Treatment/Home Care Needs:  
 
Continue prenatal vitamins. Continue to use squirt bottle with warm water on your episiotomy after each bathroom use until bleeding stops. If steri-strips applied to your incision, remove in 7 days. Take stool softeners daily. Call your doctor for the following:  
 
Fever over 101 degrees by mouth. Vaginal bleeding heavier than a normal menstrual period or lost larger than a golf ball. Red streaks or increased swelling of legs, painful red streaks on your breast. 
Painful urination, or increased pain, redness or discharge with your incision. Pain Management:  
 
Pain Management:  
Take Acetaminophen (Tylenol) or Ibuprofen (Advil, Motrin), as directed for pain. Use a warm Sitz bath 3 times daily to relieve episiotomy or hemorrhoidal discomfort. Heating pad to  incision as needed. For hemorrhoidal discomfort, use Tucks and Anusol cream as needed and directed. Follow-Up Care:  
 
Appointment with MD: Follow-up Appointments Procedures  FOLLOW UP VISIT Appointment in: 6 Weeks Standing Status:   Standing Number of Occurrences:   1 Order Specific Question:   Appointment in Answer:   6 Weeks Telephone number: [phone] Signed By: Keshia Perea MD                                                                                                   Date: 2017 Time: 8:59 AM 
 
 
  
  
  
[facility] Announcement We are excited to announce that we are making your provider's discharge notes available to you in twenty5media. You will see these notes when they are completed and signed by the physician that discharged you from your recent hospital stay. If you have any questions or concerns about any information you see in twenty5media, please call the Health Information Department where you were seen or reach out to your Primary Care Provider for more information about your plan of care. Introducing [facility] & HE[facility]! Dear Jarrod Hoffman: Thank you for requesting a twenty5media account. Our records indicate that you already have an active twenty5media account. You can access your account anytime at [email or web address] Did you know that you can access your hospital and ER discharge instructions at any time in twenty5media? You can also review all of your test results from your hospital stay or ER visit. Additional Information If you have questions, please visit the Frequently Asked Questions section of the twenty5media website at [email or web address] Remember, twenty5media is NOT to be used for urgent needs. For medical emergencies, dial 911. Now available from your iPhone and Android! Providers Seen During Your Hospitalization Provider Specialty Primary office phone Gio Quiñones MD Obstetrics & Gynecology [phone] Immunizations Administered for This Admission Name Date Rho(D) Immune Globulin - IM 12/9/2017 Your Primary Care Physician (PCP) Primary Care Physician Office Phone Office Fax Stacy Og [phone] You are allergic to the following No active allergies Recent Documentation Breastfeeding? OB Status Smoking Status Unknown Recent pregnancy Former Smoker Emergency Contacts Name Discharge Info Relation Home Work Mobile Kemi Elena CAREGIVER [3] Mother [14]   [phone] Patient Belongings The following personal items are in your possession at time of discharge: 
  Dental Appliances: None         Home Medications: None   Jewelry: Earrings  Clothing: Footwear, Jacket/Coat, Shirt, Socks, Undergarments    Other Valuables: [employer] Please provide this summary of care documentation to your next provider. Signatures-by signing, you are acknowledging that this After Visit Summary has been reviewed with you and you have received a copy. Patient Signature:  ____________________________________________________________ Date:  ____________________________________________________________  
  
[facility] Provider Signature:  ____________________________________________________________ Date:  ____________________________________________________________

## 2017-12-09 LAB
BASOPHILS # BLD: 0 K/UL (ref 0–0.1)
BASOPHILS NFR BLD: 0 % (ref 0–1)
EOSINOPHIL # BLD: 0.1 K/UL (ref 0–0.4)
EOSINOPHIL NFR BLD: 1 % (ref 0–7)
ERYTHROCYTE [DISTWIDTH] IN BLOOD BY AUTOMATED COUNT: 12.7 % (ref 11.5–14.5)
HCT VFR BLD AUTO: 32 % (ref 35–47)
HGB BLD-MCNC: 11 G/DL (ref 11.5–16)
LYMPHOCYTES # BLD: 2 K/UL (ref 0.8–3.5)
LYMPHOCYTES NFR BLD: 20 % (ref 12–49)
MCH RBC QN AUTO: 32.9 PG (ref 26–34)
MCHC RBC AUTO-ENTMCNC: 34.4 G/DL (ref 30–36.5)
MCV RBC AUTO: 95.8 FL (ref 80–99)
MONOCYTES # BLD: 0.6 K/UL (ref 0–1)
MONOCYTES NFR BLD: 6 % (ref 5–13)
NEUTS SEG # BLD: 7.1 K/UL (ref 1.8–8)
NEUTS SEG NFR BLD: 73 % (ref 32–75)
PLATELET # BLD AUTO: 130 K/UL (ref 150–400)
RBC # BLD AUTO: 3.34 M/UL (ref 3.8–5.2)
WBC # BLD AUTO: 9.7 K/UL (ref 3.6–11)

## 2017-12-09 PROCEDURE — 77030036554

## 2017-12-09 PROCEDURE — 74011250636 HC RX REV CODE- 250/636: Performed by: ANESTHESIOLOGY

## 2017-12-09 PROCEDURE — 36415 COLL VENOUS BLD VENIPUNCTURE: CPT | Performed by: OBSTETRICS & GYNECOLOGY

## 2017-12-09 PROCEDURE — 74011250637 HC RX REV CODE- 250/637: Performed by: OBSTETRICS & GYNECOLOGY

## 2017-12-09 PROCEDURE — 65270000029 HC RM PRIVATE

## 2017-12-09 PROCEDURE — 85025 COMPLETE CBC W/AUTO DIFF WBC: CPT | Performed by: OBSTETRICS & GYNECOLOGY

## 2017-12-09 PROCEDURE — 85461 HEMOGLOBIN FETAL: CPT | Performed by: OBSTETRICS & GYNECOLOGY

## 2017-12-09 PROCEDURE — 74011250636 HC RX REV CODE- 250/636: Performed by: OBSTETRICS & GYNECOLOGY

## 2017-12-09 PROCEDURE — 86900 BLOOD TYPING SEROLOGIC ABO: CPT | Performed by: OBSTETRICS & GYNECOLOGY

## 2017-12-09 PROCEDURE — 3E0234Z INTRODUCTION OF SERUM, TOXOID AND VACCINE INTO MUSCLE, PERCUTANEOUS APPROACH: ICD-10-PCS | Performed by: OBSTETRICS & GYNECOLOGY

## 2017-12-09 RX ADMIN — IBUPROFEN 600 MG: 600 TABLET, FILM COATED ORAL at 15:39

## 2017-12-09 RX ADMIN — HUMAN RHO(D) IMMUNE GLOBULIN 0.3 MG: 300 INJECTION, SOLUTION INTRAMUSCULAR at 13:04

## 2017-12-09 RX ADMIN — IBUPROFEN 600 MG: 600 TABLET, FILM COATED ORAL at 23:55

## 2017-12-09 RX ADMIN — KETOROLAC TROMETHAMINE 30 MG: 30 INJECTION, SOLUTION INTRAMUSCULAR at 09:30

## 2017-12-09 NOTE — PROGRESS NOTES
SBAR IN Report: Mother    Verbal report received from [address] [place] [address] [phone] (full name & credentials) on this patient, who is now being transferred from Labor and Delivery Unit (unit) for routine progression of care. The patient is wearing a green \"Anesthesia-Duramorph\" band. Report consisted of patient's Situation, Background, Assessment and Recommendations (SBAR).  ID bands were compared with the identification form, and verified with the patient and transferring nurse. Information from the SBAR and [facility] and the [facility] Report was reviewed with the transferring nurse; opportunity for questions and clarification provided.

## 2017-12-09 NOTE — PROGRESS NOTES
1840: To L&D OR 1 with patient and JULIUS Parks RN  1616: Bedside SBAR report received from JULIUS Parks RN  8798: TRANSFER - OUT REPORT:    Verbal report given to KARIE Machuca RN(name) on Edy Carp  being transferred to MIU(unit) for routine progression of care       Report consisted of patients Situation, Background, Assessment and   Recommendations(SBAR). Information from the following report(s) SBAR, Kardex, OR Summary, Intake/Output and MAR was reviewed with the receiving nurse. Lines:   Peripheral IV 12/08/17 Right Wrist (Active)   Site Assessment Clean, dry, & intact 12/8/2017  9:23 PM   Phlebitis Assessment 0 12/8/2017  9:23 PM   Infiltration Assessment 0 12/8/2017  9:23 PM   Dressing Status Clean, dry, & intact 12/8/2017  9:23 PM   Dressing Type Transparent;Tape 12/8/2017  9:23 PM   Hub Color/Line Status Pink 12/8/2017  9:23 PM        Opportunity for questions and clarification was provided.       Patient transported with:   Registered Nurse

## 2017-12-09 NOTE — LACTATION NOTE
This note was copied from a baby's chart. Discussed with mother her plan for feeding. Reviewed the benefits of exclusive breast milk feeding during the hospital stay. Informed her of the risks of using formula to supplement in the first few days of life as well as the benefits of successful breast milk feeding; referred her to the Breastfeeding booklet about this information. She acknowledges understanding of information reviewed and states that it is her plan to try to breastfeed her infant. Will support her choice and offer additional information as needed. Reviewed breastfeeding basics:  How milk is made and normal  breastfeeding behaviors discussed. Supply and demand,  stomach size, early feeding cues, skin to skin bonding with comfortable positioning and baby led latch-on reviewed. How to identify signs of successful breastfeeding sessions reviewed; education on assymetrical latch, signs of effective latching vs shallow, in-effective latching, normal  feeding frequency and duration and expected infant output discussed. Normal course of breastfeeding discussed including the AAP's recommendation that children receive exclusive breast milk feedings for the first six months of life with breast milk feedings to continue through the first year of life and/or beyond as complimentary table foods are added. Breastfeeding Booklet and Warm line information provided with discussion. Discussed typical  weight loss and the importance of pediatrician appointment within 24-48 hours of discharge, at 2 weeks of life and normalcy of requesting pediatric weight checks as needed in between visits. Reviewed breastfeeding techniques and positions with mother until found a position she was most comfortable with. Reminded mother of early feeding cues and that breast fed infants should be fed on demand without time restriction on the first breast until the infant seems satisfied.  Then the second breast is offered. Advised mother to awaken  to feed if three hours have passed since baby last ate. Will continue to monitor mother's progress with breastfeeding and offer assistance at any time. Hand Expression Education:  Mom taught how to manually hand express her colostrum. Emphasized the importance of providing infant with valuable colostrum as infant rests skin to skin at breast.  Aware to avoid extended periods of non-feeding. Aware to offer 10-20+ drops of colostrum every 2-3 hours until infant is latching and nursing effectively. Taught the rationale behind this low tech but highly effective evidence based practice. Pt will successfully establish breastfeeding by feeding in response to early feeding cues   or wake every 3h, will obtain deep latch, and will keep log of feedings/output. Taught to BF at hunger cues and or q 2-3 hrs and to offer 10-20 drops of hand expressed colostrum at any non-feeds.       Breast Assessment  Left Breast: Small   Left Nipple: Everted, Short  Right Breast: Small   Right Nipple: Everted, Short  Breast- Feeding Assessment  Attends Breast-Feeding Classes: No  Breast-Feeding Experience: No  Breast Trauma/Surgery: No  Type/Quality: [facility]  Lactation Consultant Visits  Breast-Feedings: Fair  Mother/Infant Observation  Mother Observation: Alignment, Breast comfortable, Close hold  Infant Observation: Latches nipple and aereolae, Lips flanged, lower, Lips flanged, upper, Opens mouth  LATCH Documentation  Latch: Grasps breast, tongue down, lips flanged, rhythmic sucking  Audible Swallowing: A few with stimulation  Type of Nipple: Everted (after stimulation) (short)  Comfort (Breast/Nipple): Soft/non-tender  Hold (Positioning): Full assist, teach one side, mother does other, staff holds  LATCH Score: 8  Baby latches well for a few minutes, rhythmic suckling noted after 15 minutes of expressing drops with baby licking at breast. Baby is spitty at times, mother easily frustrated. Mother able to hand express many drops easily to baby.

## 2017-12-09 NOTE — PROGRESS NOTES
Post-Operative Day Number 1 Progress Note    Patient doing well post-op day 1 from  delivery without significant complaints. Pain controlled on current medication. Voiding without difficulty, normal lochia. Vitals:  Patient Vitals for the past 8 hrs:   BP Temp Pulse Resp   17 0425 118/72 98.1 °F (36.7 °C) 94 18     Temp (24hrs), Av.8 °F (36.6 °C), Min:97.5 °F (36.4 °C), Max:98.2 °F (36.8 °C)      Vital signs stable, afebrile. Exam:  Patient without distress. Abdomen soft, fundus firm at level of umbilicus, nontender. Incision dry and clean without erythema. Lower extremities are negative for swelling, cords or tenderness. Labs:   Recent Results (from the past 24 hour(s))   DRUG SCREEN, URINE    Collection Time: 17  5:44 PM   Result Value Ref Range    AMPHETAMINES NEGATIVE  NEG      BARBITURATES NEGATIVE  NEG      BENZODIAZEPINES NEGATIVE  NEG      COCAINE NEGATIVE  NEG      METHADONE NEGATIVE  NEG      OPIATES NEGATIVE  NEG      PCP(PHENCYCLIDINE) NEGATIVE  NEG      THC (TH-CANNABINOL) NEGATIVE  NEG      Drug screen comment (NOTE)    CBC WITH AUTOMATED DIFF    Collection Time: 17  5:54 PM   Result Value Ref Range    WBC 7.4 3.6 - 11.0 K/uL    RBC 3.79 (L) 3.80 - 5.20 M/uL    HGB 12.6 11.5 - 16.0 g/dL    HCT 35.8 35.0 - 47.0 %    MCV 94.5 80.0 - 99.0 FL    MCH 33.2 26.0 - 34.0 PG    MCHC 35.2 30.0 - 36.5 g/dL    RDW 12.5 11.5 - 14.5 %    PLATELET 852 (L) 457 - 400 K/uL    NEUTROPHILS 58 32 - 75 %    LYMPHOCYTES 32 12 - 49 %    MONOCYTES 8 5 - 13 %    EOSINOPHILS 2 0 - 7 %    BASOPHILS 0 0 - 1 %    ABS. NEUTROPHILS 4.3 1.8 - 8.0 K/UL    ABS. LYMPHOCYTES 2.3 0.8 - 3.5 K/UL    ABS. MONOCYTES 0.6 0.0 - 1.0 K/UL    ABS. EOSINOPHILS 0.1 0.0 - 0.4 K/UL    ABS.  BASOPHILS 0.0 0.0 - 0.1 K/UL   CBC WITH AUTOMATED DIFF    Collection Time: 17  4:23 AM   Result Value Ref Range    WBC 9.7 3.6 - 11.0 K/uL    RBC 3.34 (L) 3.80 - 5.20 M/uL    HGB 11.0 (L) 11.5 - 16.0 g/dL    HCT 32.0 (L) 35.0 - 47.0 %    MCV 95.8 80.0 - 99.0 FL    MCH 32.9 26.0 - 34.0 PG    MCHC 34.4 30.0 - 36.5 g/dL    RDW 12.7 11.5 - 14.5 %    PLATELET 747 (L) 085 - 400 K/uL    NEUTROPHILS 73 32 - 75 %    LYMPHOCYTES 20 12 - 49 %    MONOCYTES 6 5 - 13 %    EOSINOPHILS 1 0 - 7 %    BASOPHILS 0 0 - 1 %    ABS. NEUTROPHILS 7.1 1.8 - 8.0 K/UL    ABS. LYMPHOCYTES 2.0 0.8 - 3.5 K/UL    ABS. MONOCYTES 0.6 0.0 - 1.0 K/UL    ABS. EOSINOPHILS 0.1 0.0 - 0.4 K/UL    ABS. BASOPHILS 0.0 0.0 - 0.1 K/UL       Assessment and Plan:  Patient appears to be having uncomplicated post- course. Continue routine post-op care and maternal education.

## 2017-12-09 NOTE — OP NOTES
[facility]   [address] [facility], [address]   OP NOTE       Name:  César Rojas   MR#:  [id number]   :  1990   Account #:  [de-identified]    Surgery Date:  2017   Date of Adm:  2017       PREOPERATIVE DIAGNOSES:   1. Intrauterine pregnancy at 38 weeks. 2. Suspicious fetal surveillance. 3. Noninducible cervix. POSTOPERATIVE DIAGNOSES:   1. Intrauterine pregnancy at 38 weeks. 2. Suspicious fetal surveillance. 3. Noninducible cervix. 4. Delivered. PROCEDURES PERFORMED: Primary low transverse    section. : Delgado Brown MD    ANESTHESIA: Spinal.    INDICATIONS: The patient was seen at the Maternal Fetal Medicine   office today for fetal surveillance due to polyhydramnios. The   biophysical profile was 8/10. However, on NST she had some   prolonged late decelerations with spontaneous contractions. She was   transferred to labor and delivery for prolonged monitoring and again   had long, approximately 3-minute late decelerations with slow recovery   with spontaneous contractions. Cervix is long and closed. It was   elected to proceed with  section for delivery due to suspicious   fetal surveillance, essentially a positive contraction stress test and a   noninducible cervix. ESTIMATED BLOOD LOSS: 800 mL. SPECIMENS REMOVED: To Pathology, none. FINDINGS: Beola Mikayla live male infant, Apgars were 8 and 9. DESCRIPTION OF PROCEDURE: The patient was taken to the   operating room and placed on the table in supine position with a left   lateral tilt. Following assurance of adequate spinal anesthesia, a low   transverse skin incision was made. This was carried down to the   fascia, which was incised transversely, and the rectus muscles were   bluntly and sharply removed from the overlying fascia. Peritoneal   cavity was entered and the bladder flap was taken down off the lower   uterine segment.  Uterus was scored in a transverse fashion, entered in   the midline. Clear fluid was noted. The incision was extended with the   's fingers. Vertex was then delivered through the incision. Mouth and nose were suctioned. There was a nuchal cord x1 which   was reduced. Remainder of the infant was delivered. Cord was   clamped and cut. The infant was handed off to pediatric nursing in   attendance. The placenta was manually removed. The uterus was   exteriorized. The cut edges of the myometrium were grasped with Jailene Bis-  Glenn clamps and closed with running locking 0 Vicryl after cleaning the   anterior of the uterus. There was a small amount of bleeding in the   midline of the uterine incision. This was controlled with 2 figure-of-eight   0 Vicryl sutures. Inspection showed good hemostasis and the uterus   was returned to the abdominal cavity. The abdominal cavity was   lavaged with saline and again, there was some bleeding in the midline   and this was controlled with a third figure-of-eight suture. Extensive   observation showed good hemostasis at this time. Inspection in the   subcutaneous area showed some oozing at the inferior portion of the   rectus muscles were taken off the fascia. This was controlled with   cautery and Selina powder. There was good hemostasis. Fascia was   then closed with a running 0 PDS. The subcutaneous tissues were   lavaged with saline. Inspection showed good hemostasis and the   subcu tissues were reapproximated with 3-0 plain gut, and then the   skin was closed with 3-0 Monocryl in a subcuticular fashion. Steri-  Strips and a pressure dressing were applied. Sponge, needle and   instrument counts were correct. Estimated blood loss was 800 mL.         Onofre Whipple MD      [address] / CD   D:  12/08/2017   19:50   T:  12/09/2017   07:59   Job #:  [id number]

## 2017-12-09 NOTE — ROUTINE PROCESS
Bedside and Verbal shift change report given to LETTY Machuca RN (oncoming nurse) by Victoriano Rivera RN (offgoing nurse). Report included the following information SBAR, Kardex, Intake/Output, MAR and Recent Results.

## 2017-12-09 NOTE — LACTATION NOTE
This note was copied from a baby's chart. Went in to visit with mother, mother states baby is very sleepy, just had a feeding where she express 20 drops, mother to call [facility] when she is ready for next feeding.

## 2017-12-09 NOTE — PROGRESS NOTES
Bedside shift change report given to Abbi Zheng (oncoming nurse) by Rufina (offgoing nurse). Report included the following information SBAR and MAR.

## 2017-12-09 NOTE — ANESTHESIA POSTPROCEDURE EVALUATION
Post-Anesthesia Evaluation and Assessment    Patient: Timbo Jones MRN: [id number]  SSN: [id number]    YOB: 1990  Age: 33 y.o. Sex: female       Cardiovascular Function/Vital Signs  Visit Vitals    /67    Pulse 86    Temp 36.4 °C (97.5 °F)    Resp 12    SpO2 95%       Patient is status post spinal anesthesia for Procedure(s):   SECTION. Nausea/Vomiting: None    Postoperative hydration reviewed and adequate. Pain:  Pain Scale 1: Numeric (0 - 10) (17)  Pain Intensity 1: 0 (17)   Managed    Neurological Status:   Neuro (WDL): Exceptions to WDL (17)  Neuro  LLE Motor Response: Numbness (17)  RLE Motor Response: Numbness (17)   At baseline    Mental Status and Level of Consciousness: Arousable    Pulmonary Status:   O2 Device: Room air (17)   Adequate oxygenation and airway patent    Complications related to anesthesia: None    Post-anesthesia assessment completed.  No concerns    Signed By: Kae Griffith MD     2017

## 2017-12-09 NOTE — L&D DELIVERY NOTE
OPERATIVE NOTE    Preop Dx: IUP 38 weeks                   Suspicious fetal surveillance     Postop Dx:  Same/delivered    Procedure: primary LTCS    Op:  Raymond    Anes: spinal    EBL:  476LR    Complications:  none

## 2017-12-10 LAB
ABO + RH BLD: NORMAL
BLD PROD TYP BPU: NORMAL
BPU ID: NORMAL
FETAL SCREEN,FMHS: NORMAL
STATUS OF UNIT,%ST: NORMAL
UNIT DIVISION, %UDIV: 0
WEAK D AG RBC QL: NORMAL

## 2017-12-10 PROCEDURE — 65270000029 HC RM PRIVATE

## 2017-12-10 PROCEDURE — 74011250637 HC RX REV CODE- 250/637: Performed by: OBSTETRICS & GYNECOLOGY

## 2017-12-10 RX ORDER — DOCUSATE SODIUM 100 MG/1
100 CAPSULE, LIQUID FILLED ORAL
Status: DISCONTINUED | OUTPATIENT
Start: 2017-12-10 | End: 2017-12-11 | Stop reason: HOSPADM

## 2017-12-10 RX ADMIN — SIMETHICONE CHEW TAB 80 MG 80 MG: 80 TABLET ORAL at 12:27

## 2017-12-10 RX ADMIN — DOCUSATE SODIUM 100 MG: 100 CAPSULE, LIQUID FILLED ORAL at 12:27

## 2017-12-10 RX ADMIN — IBUPROFEN 600 MG: 600 TABLET, FILM COATED ORAL at 19:42

## 2017-12-10 RX ADMIN — MUPIROCIN: 20 OINTMENT TOPICAL at 12:27

## 2017-12-10 RX ADMIN — SIMETHICONE CHEW TAB 80 MG 80 MG: 80 TABLET ORAL at 17:45

## 2017-12-10 RX ADMIN — IBUPROFEN 600 MG: 600 TABLET, FILM COATED ORAL at 07:42

## 2017-12-10 RX ADMIN — DOCUSATE SODIUM 100 MG: 100 CAPSULE, LIQUID FILLED ORAL at 17:45

## 2017-12-10 RX ADMIN — IBUPROFEN 600 MG: 600 TABLET, FILM COATED ORAL at 13:41

## 2017-12-10 NOTE — PROGRESS NOTES
Post-Operative Day Number 2 Progress Note    Patient doing well post-op day 2 from  delivery without significant complaints. Pain controlled on current medication. Voiding without difficulty, normal lochia. Vitals:  Patient Vitals for the past 8 hrs:   BP Temp Pulse Resp   12/10/17 0632 98/58 98.4 °F (36.9 °C) 76 16     Temp (24hrs), Av.4 °F (36.9 °C), Min:98.1 °F (36.7 °C), Max:98.6 °F (37 °C)      Vital signs stable, afebrile. Exam:  Patient without distress. Abdomen soft, fundus firm at level of umbilicus, nontender. Incision dry and clean without erythema. Lower extremities are negative for swelling, cords or tenderness. Labs: No results found for this or any previous visit (from the past 24 hour(s)). Assessment and Plan:  Patient appears to be having uncomplicated post- course. Continue routine post-op care and maternal education.

## 2017-12-10 NOTE — PROGRESS NOTES
Bedside shift change report given to Thomas Trujillo (oncoming nurse) by Eugenie Castro (offgoing nurse). Report included the following information SBAR and MAR.

## 2017-12-11 VITALS
OXYGEN SATURATION: 95 % | HEART RATE: 78 BPM | DIASTOLIC BLOOD PRESSURE: 64 MMHG | SYSTOLIC BLOOD PRESSURE: 107 MMHG | TEMPERATURE: 98.2 F | RESPIRATION RATE: 16 BRPM

## 2017-12-11 PROCEDURE — 74011250637 HC RX REV CODE- 250/637: Performed by: OBSTETRICS & GYNECOLOGY

## 2017-12-11 RX ORDER — IBUPROFEN 800 MG/1
800 TABLET ORAL
Qty: 60 TAB | Refills: 0 | Status: SHIPPED | OUTPATIENT
Start: 2017-12-11 | End: 2018-01-17

## 2017-12-11 RX ADMIN — IBUPROFEN 600 MG: 600 TABLET, FILM COATED ORAL at 11:46

## 2017-12-11 RX ADMIN — DOCUSATE SODIUM 100 MG: 100 CAPSULE, LIQUID FILLED ORAL at 11:46

## 2017-12-11 RX ADMIN — IBUPROFEN 600 MG: 600 TABLET, FILM COATED ORAL at 05:36

## 2017-12-11 NOTE — PROGRESS NOTES
12/11/17 1:02 PM  CM met with TRUPTI and her mother, [facility] ([phone]) to complete initial assessment and to begin discharge planning. Demographics were reviewed and confirmed. TRUPTI lives with her grandmother, Tomás Franklin; TRUPTI's mother lives close and will provide transportation home at discharge. TRUPTI's grandmother also provides transportation to [place] when needed. Family and friends live locally and are a great support for TRUPTI. [facility] has already met with TRUPTI regarding Medicaid, as she will need Medicaid for the baby and for herself, as [employer] ends this month due to her age. TRUPTI is breastfeeding and has a pump to use at home. Patient has car seat, crib, clothing, and other necessary supplies. Pediatric care will be provided [facility]; the baby's appointment is scheduled for Tuesday at 1:30 PM.  TRUPTI does not work and plans to stay home with baby. MOB and CM discussed TRUPTI's history of substance use. TRUPTI noted that she has been sober off and on for a while and that getting sober \"has been the hardest thing I'll ever do. \"  TRUPTI stated that she had been sober for 7 months, had a 2 week relapse, went to [facility] (due to being violated on her probation for testing positive), and found out she was pregnant. She was incarcerated for the first trimester and then court ordered to treatment in [place] at [facility], a rehabilitation home for pregnant substance addicted women. She left this program on November 17th and immediately joined [facility] for post rehab support. Her family is very aware of her substance use history and supportive. Her and baby's UDS screens were both negative; meconium pending for the baby and CM will monitor. Care Management Interventions  PCP Verified by CM:  Yes  Mode of Transport at Discharge: Self  Transition of Care Consult (CM Consult): Discharge Planning  Current Support Network: [place] (Lives with grandmother)  Confirm Follow Up Transport: Family  Plan discussed with Pt/Family/Caregiver: Yes  Discharge Location  Discharge Placement: Home with outpatient services  TATYANA Edwards

## 2017-12-11 NOTE — ROUTINE PROCESS
Bedside and Verbal shift change report given to Barbara العراقي RN (oncoming nurse) by Enma Padilla RN (offgoing nurse). Report included the following information SBAR, Kardex, Intake/Output and MAR.

## 2017-12-11 NOTE — LACTATION NOTE
This note was copied from a baby's chart. Went in to visit with mother, mother states that baby just fed for 10 minutes then took formula after. Mother states that nurse last night discussed adding supplementation because baby's weight loss is 9.4%. Discussed with mother to option to add pumping as well and then supplementing with EBM. Mother states she does not want to pump now but will add pumping when she gets home if she is still having to supplement. Discussed the importance of adding pumping for the stimulation if baby is being supplemented to help with milk coming in. Mother agrees. Mother encouraged to call [phone] when she feeds again before discharge for latch assessment. Reviewed breastfeeding basics:  How milk is made and normal  breastfeeding behaviors discussed. Supply and demand,  stomach size, early feeding cues, skin to skin bonding with comfortable positioning and baby led latch-on reviewed. How to identify signs of successful breastfeeding sessions reviewed; education on assymetrical latch, signs of effective latching vs shallow, in-effective latching, normal  feeding frequency and duration and expected infant output discussed. Normal course of breastfeeding discussed including the AAP's recommendation that children receive exclusive breast milk feedings for the first six months of life with breast milk feedings to continue through the first year of life and/or beyond as complimentary table foods are added. Breastfeeding Booklet and Warm line information provided with discussion. Discussed typical  weight loss and the importance of pediatrician appointment within 24-48 hours of discharge, at 2 weeks of life and normalcy of requesting pediatric weight checks as needed in between visits.

## 2017-12-11 NOTE — DISCHARGE SUMMARY
Obstetrical Discharge Summary     Name: Ciaran Reddy MRN: [id number]  SSN: [id number]    YOB: 1990  Age: 33 y.o. Sex: female      Admit Date: 2017    Discharge Date: 2017     Admitting Physician: Zuleika Nur MD     Attending Physician:  Peter Melo MD     Admission Diagnoses: Pregnancy;Pregnancy;Pregnancy    Discharge Diagnoses:   Information for the patient's :  Danielle Ceja, Male [[id number]]   Delivery of a 7 lb 4.1 oz (3.29 kg) male infant via , Low Transverse on 2017 at 7:06 PM  by . Apgars were 8 and 9. Additional Diagnoses:   Hospital Problems  Date Reviewed: 2017          Codes Class Noted POA    Pregnancy ICD-10-CM: Z34.90  ICD-9-CM: V22.2  2017 Unknown             Lab Results   Component Value Date/Time    Rubella, External immune 2017    GrBStrep, External Negative 2017       Hospital Course:  Admitted for NRFS at [facility] after referral for poly at term. FHR suspicious in L&D and pt underwent LTCS. Benign pp course    Patient Instructions:   Current Discharge Medication List      START taking these medications    Details   ibuprofen (MOTRIN) 800 mg tablet Take 1 Tab by mouth every six (6) hours as needed for Pain. Qty: 60 Tab, Refills: 0         CONTINUE these medications which have NOT CHANGED    Details   PNV No12-Iron-FA-DSS-OM-3 29 mg iron-1 mg -50 mg CPKD Take  by mouth. Reference my discharge instructions.     Follow-up Appointments   Procedures    FOLLOW UP VISIT Appointment in: 6 Weeks     Standing Status:   Standing     Number of Occurrences:   1     Order Specific Question:   Appointment in     Answer:   6 Weeks        Signed By:  Peter Melo MD     2017

## 2017-12-11 NOTE — PROGRESS NOTES
Post-Operative  Day 3    Serjio Goode         Information for the patient's newbornSrinivas Rose, Male [[id number]]   , Low Transverse   Patient doing well without significant complaint. Tolerating diet, passing flatus, voiding and ambulating without difficulty    Vitals:  Visit Vitals    /64 (BP 1 Location: Left arm, BP Patient Position: At rest)    Pulse 78    Temp 98.2 °F (36.8 °C)    Resp 16    LMP 03/15/2017    SpO2 95%    Breastfeeding Unknown     Temp (24hrs), Av.2 °F (36.8 °C), Min:98.2 °F (36.8 °C), Max:98.2 °F (36.8 °C)        Exam:        Patient without distress. Abdomen, bowel sounds present, soft, expected tenderness, fundus firm                Wound incision clean, dry and intact               Lower extremities are negative for swelling, cords or tenderness. Labs:   Lab Results   Component Value Date/Time    WBC 9.7 2017 04:23 AM    WBC 7.4 2017 05:54 PM    HGB 11.0 2017 04:23 AM    HGB 12.6 2017 05:54 PM    HCT 32.0 2017 04:23 AM    HCT 35.8 2017 05:54 PM    PLATELET 394  04:23 AM    PLATELET 783  05:54 PM    Hgb, External 11.7 2017    Hgb, External 13.0 2017    Hct, External 34.2 2017    Hct, External 37.7 2017    Platelet cnt., External 221 2017    Platelet cnt., External 238 2017       No results found for this or any previous visit (from the past 24 hour(s)). Assessment: Post-Op day 3, doing well    Plan:   1. Discharge home today  2. Follow up in office in 6 weeks with Roger Kawasaki, MD  3. Post partum activity/wound care advised, diet as tolerated  4.  Discharge Medications: ibuprofen, percocet and medications prior to admission

## 2017-12-11 NOTE — DISCHARGE INSTRUCTIONS
POST DELIVERY DISCHARGE INSTRUCTIONS    Name: Yan Javed  YOB: 1990  Primary Diagnosis: Active Problems:    Pregnancy (2017)        General:     Diet/Diet Restrictions:  Eight 8-ounce glasses of fluid daily (water, juices); avoid excessive caffeine intake. Meals/snacks as desired which are high in fiber and carbohydrates and low in fat and cholesterol. Physical Activity / Restrictions / Safety:     Avoid heavy lifting, no more that 8 lbs. For 2-3 weeks; No driving while taking narcotic pain medication. Post  patients should not drive until pain free. No intercourse 4-6 weeks, no douching or tampon use. May resume exercise in 6 weeks. Discharge Instructions/Special Treatment/Home Care Needs:     Continue prenatal vitamins. Continue to use squirt bottle with warm water on your episiotomy after each bathroom use until bleeding stops. If steri-strips applied to your incision, remove in 7 days. Take stool softeners daily. Call your doctor for the following:     Fever over 101 degrees by mouth. Vaginal bleeding heavier than a normal menstrual period or lost larger than a golf ball. Red streaks or increased swelling of legs, painful red streaks on your breast.  Painful urination, or increased pain, redness or discharge with your incision. Pain Management:     Pain Management:   Take Acetaminophen (Tylenol) or Ibuprofen (Advil, Motrin), as directed for pain. Use a warm Sitz bath 3 times daily to relieve episiotomy or hemorrhoidal discomfort. Heating pad to  incision as needed. For hemorrhoidal discomfort, use Tucks and Anusol cream as needed and directed.     Follow-Up Care:     Appointment with MD:   Follow-up Appointments   Procedures    FOLLOW UP VISIT Appointment in: 6 Weeks     Standing Status:   Standing     Number of Occurrences:   1     Order Specific Question:   Appointment in     Answer:   6 Weeks     Telephone number: [phone]    Signed By: Tiara Felix MD                                                                                                   Date: 12/11/2017 Time: 8:59 AM

## 2017-12-11 NOTE — PROGRESS NOTES
Pt off unit in stable condition via wheelchair with volunteers for discharge home per Dr. Tina Fang. Pt is to follow-up in 6 weeks and is aware. Pt denies any HA, Dizziness, N/V or pain at this time. Infant in car seat with mother.

## 2018-01-17 ENCOUNTER — OFFICE VISIT (OUTPATIENT)
Dept: OBGYN CLINIC | Age: 28
End: 2018-01-17

## 2018-01-17 VITALS
HEIGHT: 63 IN | WEIGHT: 142.8 LBS | BODY MASS INDEX: 25.3 KG/M2 | DIASTOLIC BLOOD PRESSURE: 60 MMHG | SYSTOLIC BLOOD PRESSURE: 108 MMHG

## 2018-01-17 DIAGNOSIS — N94.89 SUPPRESSION OF MENSES: ICD-10-CM

## 2018-01-17 PROBLEM — O09.93 SUPERVISION OF HIGH RISK PREGNANCY IN THIRD TRIMESTER: Status: RESOLVED | Noted: 2017-11-30 | Resolved: 2017-12-01

## 2018-01-17 NOTE — PROGRESS NOTES
[facility]  OFFICE PROCEDURE PROGRESS NOTE        Chart reviewed for the following:   Jahaira CONTRERAS, have reviewed the History, Physical and updated the Allergic reactions for Veronica GONZALEZ [address] performed immediately prior to start of procedure:   Jahaira CONTRERAS, have performed the following reviews on Nathalie Malin prior to the start of the procedure:            * Patient was identified by name and date of birth   * Agreement on procedure being performed was verified  * Risks and Benefits explained to the patient  * Procedure site verified and marked as necessary  * Patient was positioned for comfort  * Consent was signed and verified     Time: 2:05pm      Date of procedure: 2018    Procedure performed by:  Kim Harris MD    Patient assisted by: self    How tolerated by patient: tolerated the procedure well with no complications    Post Procedural Pain Scale: 0 - No Hurt    Comments: none    Nathalie Malin is a ,  33 y.[facility]. female [facility] whose No LMP recorded. Patient is not currently having periods (Reason: Breastfeeding). was on  presents for office insertion of an [address] sub-dermal contraceptive implant. She has had an opportunity to read the [facility] [address] \"Patient Labeling and Consent Form\". We counseled Roopa Frazier about the insertion and removal procedures as well as potential side-effects, benefits and risks. She state she had no further questions and signed the consent form. She has been using abstinence to the present time. She confirmed that she has no allergies to Betadine or Xylocaine. She reclined on the examination table in the supine position with her left arm flexed at the elbow and externally rotated. The insertion site was identified and marked approximately 6-8 cm proximal to the elbow crease at the inner side of the upper arm overlying the groove between the biceps and triceps muscles.  A second louis was placed 6-8 cm above the first louis. The insertion site was cleansed with Betadine antiseptic. Approximately 5 ml of 2% xylocaine without epinephrine were injected just under the skin along the planned insertion canal. The NEXPLANON sterile applicator was carefully removed from its blister pack and kept sterile. I removed the needle cap. I visually verified the presence of NEXPLANON inside the needle tip. The skin at the insertion site was then stretched by my thumb and index finger. I then inserted the needle tip through the skin at the appropriate angle to the skin surface, just until the skin has been penetrated. The needle was gently inserted to its full length. The slider was then pushed down and then moved back until it stopped. I then removed the needle and palpated the implant in the appropriate location. The patient also palpated the implant in place. Both Veronica and I were able to confirm the presence of the Acquanetta Aland in its subdermal location by palpation. I placed a small adhesive bandage over the insertion site then wrapped a pressure bandage with sterile gauze. The patient User Card and Patient Chart Label were filled in. She was given the User Card for her records after explaining it to her in detail. I stressed to her that she must have the Acquanetta Aland removed before three years from today's date. She was then given the Personal Calendar (Bleeding Diary) with instructions in it's use. The patient received Nexplanon lot number [id number].

## 2018-01-17 NOTE — PROGRESS NOTES
Postpartum evaluation    Carly Mdcuffie is a 33 y.o. female who presents for a postpartum exam.     She is now six weeks post primary  section for suspicious fetal surveillance. Her baby is doing well. She has had no menses since delivery. She has had the following significant problems since her delivery: none    The patient is breast and bottle feeding without difficulty. The patient would like to use Nexplanon     She is currently taking: PNVs.     She is due for her next AE in 4 months.      Visit Vitals    /60 (BP 1 Location: Right arm, BP Patient Position: Sitting)    Ht 5' 3\" (1.6 m)    Wt 142 lb 12.8 oz (64.8 kg)    Breastfeeding Yes    BMI 25.3 kg/m2       PHYSICAL EXAMINATION    Constitutional  · Appearance: well-nourished, well developed, alert, in no acute distress    HENT  · Head and Face: appears normal    Neck  · Inspection/Palpation: normal appearance, no masses or tenderness  · Lymph Nodes: no lymphadenopathy present  · Thyroid: gland size normal, nontender, no nodules or masses present on palpation    Breasts  · Inspection of Breasts: breasts symmetrical, no skin changes, no discharge present, nipple appearance normal, no skin retraction present  · Palpation of Breasts and Axillae: no masses present on palpation, no breast tenderness  · Axillary Lymph Nodes: no lymphadenopathy present    Gastrointestinal  · Abdominal Examination: abdomen non-tender to palpation, normal bowel sounds, no masses present  · Liver and spleen: no hepatomegaly present, spleen not palpable  · Hernias: no hernias identified    Genitourinary  · External Genitalia: normal appearance for age, no discharge present, no tenderness present, no inflammatory lesions present, no masses present, no atrophy present  · Vagina: normal vaginal vault without central or paravaginal defects, no discharge present, no inflammatory lesions present, no masses present  · Bladder: non-tender to palpation  · Urethra: appears normal  · Cervix: normal   · Uterus: normal size, shape and consistency  · Adnexa: no adnexal tenderness present, no adnexal masses present  · Perineum: perineum within normal limits, no evidence of trauma, no rashes or skin lesions present  · Anus: anus within normal limits, no hemorrhoids present  · Inguinal Lymph Nodes: no lymphadenopathy present    Skin  · General Inspection: no rash, no lesions identified    Neurologic/Psychiatric  · Mental Status:  · Orientation: grossly oriented to person, place and time  · Mood and Affect: mood normal, affect appropriate    Assessment:  Normal postpartum check  Hep C pos  Plan:  RTO for AE.   Place nexplanon today

## 2018-01-17 NOTE — PATIENT INSTRUCTIONS
Nutrition for Breastfeeding Mothers: Care Instructions  Your Care Instructions    When a woman breastfeeds her baby, she needs more nutrients to keep herself healthy and to make the baby's milk. Breastfeeding helps build the bond between you and your baby. It gives your baby excellent health benefits. A healthy diet includes eating a variety of foods from the basic food groups: grains, vegetables, fruits, milk and milk products (such as cheese and yogurt), and meat and dried beans. Eating well during breastfeeding will ensure that you stay healthy and your baby grows and develops normally. Follow-up care is a key part of your treatment and safety. Be sure to make and go to all appointments, and call your doctor if you are having problems. It's also a good idea to know your test results and keep a list of the medicines you take. How can you care for yourself at home? · Include 3 to 4 cups of nonfat or low-fat milk or milk products in your diet every day. These include:  ¨ Milk (8 ounces equals 1 cup). ¨ Ice cream (1½ cups equals 1 cup of milk). ¨ Cheese (1½ ounces of cheese equals 1 cup). ¨ Yogurt (8 ounces equals 1 cup). · Eat at least 7 ounces of grains, such as cereals, breads, crackers, rice, or pasta, every day. One ounce is about 1 slice of bread, 1 cup of breakfast cereal, or ½ cup of cooked rice, cereal, or pasta. · Eat 3 cups of vegetables each day. Choices include:  ¨ Dark-green vegetables such as broccoli and spinach. ¨ Orange vegetables such as carrots and sweet potatoes. ¨ Dried beans (such as dale and kidney beans) and peas (such as lentils). · Every day, eat 2 cups of fresh, frozen, or canned fruit. · Eat 6½ ounces each day of protein, such as chicken, fish, lean meat, eggs, peanut butter, dried beans and peas, nuts, and seeds. One egg, 1 tablespoon of peanut butter, or ½ ounce of nuts or seeds equals 1 ounce of protein. A ½ cup of cooked beans equals 2 ounces of protein.   · Drink plenty of fluids, enough so that your urine is light yellow or clear like water. If you have kidney, heart, or liver disease and have to limit fluids, talk with your doctor before you increase the amount of fluids you drink. · Limit caffeine products, such as coffee, tea, chocolate, and some sodas. Caffeine can pass to your baby through breast milk. It may cause fussiness and sleep problems in babies. · Your doctor may recommend a vitamin supplement. Take it as recommended. · Consider joining a breastfeeding support group. These are offered at many hospitals and birthing centers by nurses, nurse-midwives, or lactation consultants. When should you call for help? Watch closely for changes in your health, and be sure to contact your doctor if you have any problems. Where can you learn more? Go to http://martin-andres.info/. Enter P234 in the search box to learn more about \"Nutrition for Breastfeeding Mothers: Care Instructions. \"  Current as of: March 16, 2017  Content Version: 11.4  © 9048-1808 [employer]. Care instructions adapted under license by [employer] (which disclaims liability or warranty for this information). If you have questions about a medical condition or this instruction, always ask your healthcare professional. [address] any warranty or liability for your use of this information.

## 2018-03-13 ENCOUNTER — TELEPHONE (OUTPATIENT)
Dept: HEMATOLOGY | Age: 28
End: 2018-03-13

## 2018-03-13 NOTE — TELEPHONE ENCOUNTER
Patient confirmed appointment for 3/16/18. Informed to bring insurance card and current medications.

## 2018-03-16 ENCOUNTER — OFFICE VISIT (OUTPATIENT)
Dept: HEMATOLOGY | Age: 28
End: 2018-03-16

## 2018-03-16 VITALS
DIASTOLIC BLOOD PRESSURE: 59 MMHG | WEIGHT: 142.6 LBS | SYSTOLIC BLOOD PRESSURE: 108 MMHG | HEIGHT: 63 IN | HEART RATE: 83 BPM | BODY MASS INDEX: 25.27 KG/M2 | TEMPERATURE: 97 F | OXYGEN SATURATION: 100 %

## 2018-03-16 DIAGNOSIS — B18.2 CHRONIC HEPATITIS C WITHOUT HEPATIC COMA (HCC): Primary | ICD-10-CM

## 2018-03-16 PROBLEM — Z34.90 PREGNANCY: Status: RESOLVED | Noted: 2017-12-08 | Resolved: 2018-03-16

## 2018-03-16 NOTE — PROGRESS NOTES
70 Antonieta Jorgensen MD, [address], [place], [place]       Presley Lopez, ZUHAIR Manzo, DARBY Mendez, ACNP-BC   Justus Monsalve, ZUHAIR Cano, ZUHAIR Gant [facility] [place] 136    at [address], [address] Chad Malagon  22.    [phone]    FAX: [address] [place] [address], [address]    [phone]    FAX: [phone]         Patient Care Team:  Antonella Canela MD as PCP - General (Obstetrics & Gynecology)      Problem List  Date Reviewed: 3/16/2018          Codes Class Noted    Chronic hepatitis C without hepatic coma [facility]) ICD-10-CM: B18.2  ICD-9-CM: 070.54  3/16/2018                The physicians listed above have asked me to see Sarah Henson in consultation regarding chronic HCV and its management. All medical records sent by the referring physicians were reviewed     The patient is a 33 y.o.  female who was noted to have abnormalities in liver chemistries tested positive for chronic HCV in 6/2016. Risk factors for acquiring HCV are IV drug use in 5509-3647. There was no history of acute incteric hepatitis at the time of these risk factors. Imaging of the liver was not performed. An assessment of liver fibrosis with biopsy or elastography has not been performed. The patient has never received treatment for chronic HCV. The most recent laboratory studies are not available. The patient has no symptoms which can be attributed to the liver disorder. The patient has not experienced fatigue, pain in the right side over the liver,     The patient completes all daily activities without any functional limitations.       ALLERGIES  No Known Allergies    MEDICATIONS  Current Outpatient Prescriptions   Medication Sig    etonogestrel (NEXPLANON) 68 mg impl by SubDERmal route.  PNV No12-Iron-FA-DSS-OM-3 29 mg iron-1 mg -50 mg CPKD Take  by mouth. No current facility-administered medications for this visit. SYSTEM REVIEW NOT RELATED TO LIVER DISEASE OR REVIEWED ABOVE:  Constitution systems: Negative for fever, chills, weight gain, weight loss. Eyes: Negative for visual changes. ENT: Negative for sore throat, painful swallowing. Respiratory: Negative for cough, hemoptysis, SOB. Cardiology: Negative for chest pain, palpitations. GI:  Negative for constipation or diarrhea. : Negative for urinary frequency, dysuria, hematuria, nocturia. Skin: Negative for rash. Hematology: Negative for easy bruising, blood clots. Musculo-skelatal: Negative for back pain, muscle pain, weakness. Neurologic: Negative for headaches, dizziness, vertigo, memory problems not related to HE. Psychology: Negative for anxiety, depression. FAMILY HISTORY:  The father is alive and healthy. The mother is alive and healthy. There is no family history of liver disease. SOCIAL HISTORY:  The patient has never been . The patient has 1 child. The patient currently currently smokes 2 cigarettes daily. The patient has never consumed significant amounts of alcohol. The patient currently works part time as a student in drug adiction. PHYSICAL EXAMINATION:  Visit Vitals    /59 (BP 1 Location: Left arm, BP Patient Position: Sitting)    Pulse 83    Temp 97 °F (36.1 °C) (Tympanic)    Ht 5' 3\" (1.6 m)    Wt 142 lb 9.6 oz (64.7 kg)    SpO2 100%    BMI 25.26 kg/m2     General: No acute distress. Eyes: Sclera anicteric. ENT: No oral lesions. Thyroid normal.  Nodes: No adenopathy. Skin: No spider angiomata. No jaundice. No palmar erythema. Respiratory: Lungs clear to auscultation. Cardiovascular: Regular heart rate. No murmurs.   No JVD.  Abdomen: Soft non-tender. Liver size normal to percussion/palpation. Spleen not palpable. No obvious ascites. Extremities: No edema. No muscle wasting. No gross arthritic changes. Neurologic: Alert and oriented. Cranial nerves grossly intact. No asterixis. LABORATORY STUDIES:  Liver [place] of [address] & Units 3/16/2018   WBC 3.4 - 10.8 x10E3/uL 6.8   ANC 1.4 - 7.0 x10E3/uL 4.0   HGB 11.1 - 15.9 g/dL 13.2    - 379 x10E3/uL 248   AST 0 - 40 IU/L 21   ALT 0 - 32 IU/L 25   Alk Phos 39 - 117 IU/L 67   Bili, Total 0.0 - 1.2 mg/dL 0.4   Bili, Direct 0.00 - 0.40 mg/dL 0.12   Albumin 3.5 - 5.5 g/dL 4.2   BUN 6 - 20 mg/dL 11   Creat 0.57 - 1.00 mg/dL 0.77   Na 134 - 144 mmol/L 143   K 3.5 - 5.2 mmol/L 4.2   Cl 96 - 106 mmol/L 103   CO2 18 - 29 mmol/L 25   Glucose 65 - 99 mg/dL 80     SEROLOGIES:  Serologies Latest Ref Rng & Units 3/16/2018 12/4/2017   Hep A Ab, Total Negative Positive (A)    Hep B Surface Ag Negative Negative    Hep B Core Ab, Total Negative Negative    Hep B Surface AB QL  Reactive    HCV RT-PCR, Quant IU/mL  653532   HCV Log10 log10 IU/mL  5.855     LIVER HISTOLOGY:  Not available or performed    ENDOSCOPIC PROCEDURES:  Not available or performed    RADIOLOGY:  Not available or performed    OTHER TESTING:  Not available or performed    ASSESSMENT AND PLAN:  Chronic HCV of unclear severity. Liver function is normal.  The platelet count is normal.      Have performed laboratory testing to monitor liver function and degree of liver injury. This included BMP, hepatic panel, CBC with platelet count,     Will perform and/or review results of HCV viral load and HCV genotype to define the specific treatment and duration of treatment that will be required. Will perform serologic and virologic studies to assess for other causes of chronic liver disease. Will perform imaging of the liver with ultrasound.       The need to perform an assessment of liver fibrosis was discussed with the patient. The Fibroscan can assess liver fibrosis and determine if a patient has advanced fibrosis or cirrhosis without the need for liver biopsy. The Fibroscan is currently available at liver [place]. This will be performed at the next office visit. The patient has not previously been treated for HCV. Discussed the treatment alternatives. The SVR/cure rate for HCV now exceeds 90% with just oral anti-viral therapy and no interferon injections or significant side effects for most patients with HCV. The specific treatment is dependent upon genotype, viral load and histology. The patient was directed to continue all current medications at the current dosages. There are no contraindications for the patient to take any medications that are necessary for treatment of other medical issues. The patient was counseled regarding alcohol consumption. The patient was counseled regarding use of illicit drugs and the risk of reinfection with HCV. She recently delivered her first baby 3 months ago. Discussed the risk of vertical transmission which is very low. Recommend the baby get tested at the age of 2 years to make sure anti-HCV from the mother has resolved so as not to get a false positive result. Vaccination for viral hepatitis A and B is not needed. The patient has serologic evidence of prior exposure or vaccination with immunity. All of the above issues were discussed with the patient. All questions were answered. The patient expressed a clear understanding of the above. 1901 [facility] 87 in 4 weeks for Fibroscan, and to initiate HCV treatment.     Demetri Chin MD  Liver [place] of [address] [place] [address] [address] 22.  [phone]

## 2018-03-16 NOTE — MR AVS SNAPSHOT
2700 [facility] [phone] Lori Mcadams 13 
[phone] Patient: Nathalie Malin MRN: [id number] :1990 Visit Information Date & Time Provider Department Dept. Phone Encounter #  
 3/16/2018 10:30 AM Christelle Rabagoblanca 47 of [facility] 219 [id number] Follow-up Instructions Return for NP for FS. Your Appointments 2018  1:30 PM  
ESTABLISHED PATIENT with MD [address] (36[facility]) Appt Note: AE  AH  
 [address] [facility] 99 [address]  
[facility] [address] Upcoming Health Maintenance Date Due  
 HPV AGE 9Y-34Y (1 of 3 - Female 3 Dose Series) 2002 Pneumococcal 19-64 Medium Risk (1 of 1 - PPSV23) 2010 DTaP/Tdap/Td series (1 - Tdap) 2012 PAP AKA CERVICAL CYTOLOGY 2020 Allergies as of 3/16/2018  Review Complete On: 3/16/2018 By: Elisha Miranda LPN No Known Allergies Current Immunizations  Never Reviewed Name Date Influenza Vaccine (Quad) PF 2017 Rho(D) Immune Globulin - IM 2017  1:04 PM  
  
 Not reviewed this visit You Were Diagnosed With   
  
 Codes Comments Chronic hepatitis C without hepatic coma (HCC)    -  Primary ICD-10-CM: B18.2 ICD-9-CM: 070.54 Vitals BP Pulse Temp Height(growth percentile) Weight(growth percentile) SpO2  
 108/59 (BP 1 Location: Left arm, BP Patient Position: Sitting) 83 97 °F (36.1 °C) (Tympanic) 5' 3\" (1.6 m) 142 lb 9.6 oz (64.7 kg) 100% BMI OB Status Smoking Status 25.26 kg/m2 Breastfeeding Former Smoker BMI and BSA Data Body Mass Index Body Surface Area  
 25.26 kg/m 2 1.7 m 2 Preferred Pharmacy Pharmacy Name Phone [id number] [place], [place] - [address] [address] AT [address] [phone] Your Updated Medication List  
  
   
This list is accurate as of 3/16/18 11:23 AM.  Always use your most recent med list.  
  
  
  
  
 Marlyse Pimple 68 mg Impl Generic drug:  etonogestrel  
by SubDERmal route. PNV No12-Iron-FA-DSS-OM-3 29 mg iron-1 mg -50 mg Cpkd Take  by mouth. We Performed the Following CBC WITH AUTOMATED DIFF [77364 CPT(R)] HCV RNA BY JOVANNA QL,RFLX TO QT [61903 CPT(R)] HEP A AB, TOTAL [id number] CPT(R)] HEP B SURFACE AB N157859 CPT(R)] HEP B SURFACE AG [id number] CPT(R)] HEP C GENOTYPE [90970 CPT(R)] HEPATIC FUNCTION PANEL [35337 CPT(R)] HEPATITIS B CORE AB, TOTAL [id number] CPT(R)] METABOLIC PANEL, BASIC [08080 CPT(R)] Follow-up Instructions Return for NP for FS. To-Do List   
 03/16/2018 Imaging:  US ABD LTD W ELASTOGRAPHY   
  
 04/18/2018 1:00 PM  
  Appointment with Wesley Briscoe NP at [place] [address] ([phone]) Patient Instructions FIBROSCAN PATIENT INFORMATION What is Fibroscan:? 
 
Fibroscan is an ultrasound device that measures liver stiffness by sending a pulse of vibrations through the liver. This translated into an immediate result that can help your healthcare team determine the level of damage to the liver as well as monitor the condition of various liver diseases over time. Fibroscan is helpful in the evaluation of the following conditions: 
 
Chronic Hepatitis C Chronic Hepatitis B Fatty Liver Disease Alcohol Liver Disease Chronic Cholestatic Liver Diseases What happens During the Scan? Patients receiving this exam lie flat on an examination table and raise the right arm above the head. The skin over the right lower rib cage is exposed and the examiner locates the correct area to be scanned. The prove of the scanner is placed directly on the patient and triggered to start.   This fells like a gentle flick against the skin and should not be uncomfortable. At least ten (10) readings are taken and the average is calculated to score the amount of liver stiffness or scar tissue. The exam should take 10-20 minutes. What do I need to do to prepare for the scan? Please do not eat or drink anything 2-4 hours  Before your Fibroscan. You should continue taking any prescribed medication and can take small sips of water or clear fluid to do so,  But avoid drinking large amounts of fluid. Please dress comfortably in clothes that will allow for easy access to the right side of the abdomen. Women are discouraged from wearing a dress on the day of the exam. 
 
Are there any special precautions? Patients who are pregnant or have an implantable device (for example, pacemaker or defibrillator) should not have this exam performed. Patients with a significant amount of fat tissue in the area the probe is pressed may be unable to have test performed. Introducing [facility] [place] [facility] SERVICES! Dear Venice Rios: Thank you for requesting a AT Internet account. Our records indicate that you already have an active AT Internet account. You can access your account anytime at [email or web address] Did you know that you can access your hospital and ER discharge instructions at any time in AT Internet? You can also review all of your test results from your hospital stay or ER visit. Additional Information If you have questions, please visit the Frequently Asked Questions section of the AT Internet website at [email or web address] Remember, AT Internet is NOT to be used for urgent needs. For medical emergencies, dial 911. Now available from your iPhone and Android! Please provide this summary of care documentation to your next provider. Your primary care clinician is listed as Jenni Pradhan. If you have any questions after today's visit, please call [phone].

## 2018-03-17 LAB
ALBUMIN SERPL-MCNC: 4.2 G/DL (ref 3.5–5.5)
ALP SERPL-CCNC: 67 IU/L (ref 39–117)
ALT SERPL-CCNC: 25 IU/L (ref 0–32)
AST SERPL-CCNC: 21 IU/L (ref 0–40)
BASOPHILS # BLD AUTO: 0.1 X10E3/UL (ref 0–0.2)
BASOPHILS NFR BLD AUTO: 1 %
BILIRUB DIRECT SERPL-MCNC: 0.12 MG/DL (ref 0–0.4)
BILIRUB SERPL-MCNC: 0.4 MG/DL (ref 0–1.2)
BUN SERPL-MCNC: 11 MG/DL (ref 6–20)
BUN/CREAT SERPL: 14 (ref 9–23)
CALCIUM SERPL-MCNC: 9.4 MG/DL (ref 8.7–10.2)
CHLORIDE SERPL-SCNC: 103 MMOL/L (ref 96–106)
CO2 SERPL-SCNC: 25 MMOL/L (ref 18–29)
CREAT SERPL-MCNC: 0.77 MG/DL (ref 0.57–1)
EOSINOPHIL # BLD AUTO: 0.2 X10E3/UL (ref 0–0.4)
EOSINOPHIL NFR BLD AUTO: 3 %
ERYTHROCYTE [DISTWIDTH] IN BLOOD BY AUTOMATED COUNT: 12.9 % (ref 12.3–15.4)
GFR SERPLBLD CREATININE-BSD FMLA CKD-EPI: 107 ML/MIN/1.73
GFR SERPLBLD CREATININE-BSD FMLA CKD-EPI: 123 ML/MIN/1.73
GLUCOSE SERPL-MCNC: 80 MG/DL (ref 65–99)
HAV AB SER QL IA: POSITIVE
HBV CORE AB SERPL QL IA: NEGATIVE
HBV SURFACE AB SER QL: REACTIVE
HBV SURFACE AG SERPL QL IA: NEGATIVE
HCT VFR BLD AUTO: 41.5 % (ref 34–46.6)
HGB BLD-MCNC: 13.2 G/DL (ref 11.1–15.9)
IMM GRANULOCYTES # BLD: 0 X10E3/UL (ref 0–0.1)
IMM GRANULOCYTES NFR BLD: 0 %
LYMPHOCYTES # BLD AUTO: 2.1 X10E3/UL (ref 0.7–3.1)
LYMPHOCYTES NFR BLD AUTO: 31 %
MCH RBC QN AUTO: 29.9 PG (ref 26.6–33)
MCHC RBC AUTO-ENTMCNC: 31.8 G/DL (ref 31.5–35.7)
MCV RBC AUTO: 94 FL (ref 79–97)
MONOCYTES # BLD AUTO: 0.5 X10E3/UL (ref 0.1–0.9)
MONOCYTES NFR BLD AUTO: 7 %
NEUTROPHILS # BLD AUTO: 4 X10E3/UL (ref 1.4–7)
NEUTROPHILS NFR BLD AUTO: 58 %
PLATELET # BLD AUTO: 248 X10E3/UL (ref 150–379)
POTASSIUM SERPL-SCNC: 4.2 MMOL/L (ref 3.5–5.2)
PROT SERPL-MCNC: 7 G/DL (ref 6–8.5)
RBC # BLD AUTO: 4.41 X10E6/UL (ref 3.77–5.28)
SODIUM SERPL-SCNC: 143 MMOL/L (ref 134–144)
WBC # BLD AUTO: 6.8 X10E3/UL (ref 3.4–10.8)

## 2018-03-21 LAB
HCV GENTYP SERPL NAA+PROBE: 3
HCV RNA SERPL NAA+PROBE-ACNC: 4900 IU/ML
HCV RNA SERPL NAA+PROBE-LOG IU: 3.69 LOG10 IU/ML
HCV RNA SERPL QL NAA+PROBE: POSITIVE
PLEASE NOTE, 550474: NORMAL
TEST INFORMATION: NORMAL

## 2018-03-26 ENCOUNTER — TELEPHONE (OUTPATIENT)
Dept: HEMATOLOGY | Age: 28
End: 2018-03-26

## 2018-03-26 DIAGNOSIS — B18.2 CHRONIC HEPATITIS C WITHOUT HEPATIC COMA (HCC): Primary | ICD-10-CM

## 2018-03-26 NOTE — TELEPHONE ENCOUNTER
Spoke with the patient this afternoon letting her know that she still had not scheduled her appointment to have her ultrasound done. Patient stated that when she was trying to schedule the appointment they told her that the order was not placed in the system correctly. Patient is expecting a call when this has been corrected so that she may schedule her appointment.

## 2018-03-28 DIAGNOSIS — K76.9 LESION OF LIVER: Primary | ICD-10-CM

## 2018-04-09 ENCOUNTER — HOSPITAL ENCOUNTER (OUTPATIENT)
Dept: ULTRASOUND IMAGING | Age: 28
Discharge: HOME | End: 2018-04-09
Attending: INTERNAL MEDICINE
Payer: MEDICAID

## 2018-04-09 DIAGNOSIS — K76.9 LESION OF LIVER: ICD-10-CM

## 2018-04-09 PROCEDURE — 76705 ECHO EXAM OF ABDOMEN: CPT

## 2018-04-18 ENCOUNTER — OFFICE VISIT (OUTPATIENT)
Dept: HEMATOLOGY | Age: 28
End: 2018-04-18

## 2018-04-18 VITALS
OXYGEN SATURATION: 98 % | BODY MASS INDEX: 26.31 KG/M2 | WEIGHT: 143 LBS | DIASTOLIC BLOOD PRESSURE: 55 MMHG | HEIGHT: 62 IN | TEMPERATURE: 97.7 F | SYSTOLIC BLOOD PRESSURE: 90 MMHG | HEART RATE: 76 BPM

## 2018-04-18 DIAGNOSIS — B18.2 CHRONIC HEPATITIS C WITHOUT HEPATIC COMA (HCC): Primary | ICD-10-CM

## 2018-04-18 NOTE — PROGRESS NOTES
70 Antonieta Jorgensen MD, [address], [place], [place]       ZUHAIR Goddard PA-C Everlean Rape, NIKOLAIP-BC   Daniel Menendez, ZUHAIR Lewis [facility] 136    at [address], [address] Chad Malagon  22.    [phone]    FAX: [address] [place] [address]    at [address], [address] [facility], [address]    [phone]    FAX: [phone]     Patient Care Team:  Jana Perales MD as PCP - General (Obstetrics & Gynecology)    Problem List  Date Reviewed: 3/17/2018          Codes Class Noted    Chronic hepatitis C without hepatic coma [facility]) ICD-10-CM: B18.2  ICD-9-CM: 070.54  3/16/2018            [place] Buzz \"Anel\" returns to the [address] for management of chronic HCV. The active problem list, all pertinent past medical history, medications, liver histology, radiologic findings and laboratory findings related to the liver disorder were reviewed with the patient. The patient is a 33 y.o.  female who was noted to have abnormalities in liver chemistries tested positive for chronic HCV in 6/2016. Risk factors for acquiring HCV are IV drug use in 6954-0053. There was no history of acute icteric hepatitis at the time of these risk factors. Imaging of the liver with ultrasound shows a normal appearing liver. An assessment of liver fibrosis with elastography will be performed this visit. The patient has never received treatment for chronic HCV. The patient has no symptoms which can be attributed to the liver disorder. The patient has not experienced fatigue or pain in the right side over the liver.       The patient completes all daily activities without any functional limitations. Her son Kyle Valentine is now 1 months old. ALLERGIES  No Known Allergies    MEDICATIONS  Current Outpatient Prescriptions   Medication Sig    etonogestrel (NEXPLANON) 68 mg impl by SubDERmal route.  PNV No12-Iron-FA-DSS-OM-3 29 mg iron-1 mg -50 mg CPKD Take  by mouth. No current facility-administered medications for this visit. SYSTEM REVIEW NOT RELATED TO LIVER DISEASE OR REVIEWED ABOVE:  Constitution systems: Negative for fever, chills, weight gain, weight loss. Eyes: Negative for visual changes. ENT: Negative for sore throat, painful swallowing. Respiratory: Negative for cough, hemoptysis, SOB. Cardiology: Negative for chest pain, palpitations. GI:  Negative for constipation or diarrhea. : Negative for urinary frequency, dysuria, hematuria, nocturia. Skin: Negative for rash. Hematology: Negative for easy bruising, blood clots. Musculo-skeletal: Negative for back pain, muscle pain, weakness. Neurologic: Negative for headaches, dizziness, vertigo, memory problems not related to HE. Psychology: Negative for anxiety, depression. FAMILY HISTORY:  The father is alive and healthy. The mother is alive and healthy. There is no family history of liver disease. SOCIAL HISTORY:  The patient has never been . The patient has 1 child. The patient currently smokes 2 cigarettes daily. 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 The patient has never consumed significant amounts of alcohol. The patient currently is a part time student in drug addiction. PHYSICAL EXAMINATION:  Visit Vitals    BP 90/55 (BP 1 Location: Right arm, BP Patient Position: Sitting)    Pulse 76    Temp 97.7 °F (36.5 °C) (Tympanic)    Ht 5' 2\" (1.575 m)    Wt 143 lb (64.9 kg)    SpO2 98%    BMI 26.16 kg/m2     General: No acute distress. Eyes: Sclera anicteric. ENT: No oral lesions. Nodes: No adenopathy. Skin: No spider angiomata. No jaundice.   No palmar erythema. Respiratory: Lungs clear to auscultation. Cardiovascular: Regular heart rate. No murmurs. No JVD. Abdomen: Soft non-tender. Liver size normal to percussion/palpation. Spleen not palpable. No obvious ascites. Extremities: No edema. No muscle wasting. No gross arthritic changes. Neurologic: Alert and oriented. Cranial nerves grossly intact. No asterixis. LABORATORY STUDIES:  Liver [place] of [address] Units 3/16/2018   WBC 3.4 - 10.8 x10E3/uL 6.8   ANC 1.4 - 7.0 x10E3/uL 4.0   HGB 11.1 - 15.9 g/dL 13.2    - 379 x10E3/uL 248   AST 0 - 40 IU/L 21   ALT 0 - 32 IU/L 25   Alk Phos 39 - 117 IU/L 67   Bili, Total 0.0 - 1.2 mg/dL 0.4   Bili, Direct 0.00 - 0.40 mg/dL 0.12   Albumin 3.5 - 5.5 g/dL 4.2   BUN 6 - 20 mg/dL 11   Creat 0.57 - 1.00 mg/dL 0.77   Na 134 - 144 mmol/L 143   K 3.5 - 5.2 mmol/L 4.2   Cl 96 - 106 mmol/L 103   CO2 18 - 29 mmol/L 25   Glucose 65 - 99 mg/dL 80     SEROLOGIES:  Serologies Latest Ref Rng & Units 3/16/2018 12/4/2017   Hep A Ab, Total Negative Positive (A)    Hep B Surface Ag Negative Negative    Hep B Core Ab, Total Negative Negative    Hep B Surface AB QL  Reactive    HCV RT-PCR, Quant IU/mL  [id number]   HCV Log10 log10 IU/mL  5.855     LIVER HISTOLOGY:  4/2018. FibroScan performed at [facility] [employer] [facility] [place]. EkPa was 2.8. IQR/med 18%. The results suggested a fibrosis level of F0. CAP is 238, inconsistent with fatty liver disease. ENDOSCOPIC PROCEDURES:  Not available or performed    RADIOLOGY:  4/2018. Normal appearing liver. No masses or lesions. OTHER TESTING:  Not available or performed    ASSESSMENT AND PLAN:  Chronic HCV with no fibrosis. Liver function is normal.  The platelet count is normal.      The patient has not previously been treated for HCV. Discussed the treatment alternatives.   The SVR/cure rate for HCV now exceeds 90% with just oral anti-viral therapy and no interferon injections or significant side effects for most patients with HCV. She is a Medicaid patient and the preferred therapy is Mavyret. 8 weeks of [place] will be ordered today. She receives pregnancy prevention via Nexplanon but it is progestin hormone and not contraindicated. The patient was directed to continue all current medications at the current dosages. There are no contraindications for the patient to take any medications that are necessary for treatment of other medical issues. The patient was counseled regarding alcohol consumption. The patient was counseled regarding use of illicit drugs and the risk of reinfection with HCV. She recently delivered her first baby 4 months ago. Discussed the risk of vertical transmission which is very low. Recommend the baby get tested at the age of 2 years. This is the length of time the baby can spontaneously clear the virus. This will ensure no false positives. Vaccination for viral hepatitis A and B is not needed. The patient has serologic evidence of prior exposure or vaccination with immunity. All of the above issues were discussed with the patient. All questions were answered. The patient expressed a clear understanding of the above. 1901 [facility] 87 4 weeks after initiation of therapy. Explained to patient when she receives her approval from pharmacy, to call the office when she receives the medication so we know her start date and we can set her up for a 4 week treatment follow up appointment.      ALEN Diane-BC  Liver [place] of [address], [address] [facility] [place] [facility] 22.  [phone]

## 2018-04-18 NOTE — PROGRESS NOTES
Chief Complaint   Patient presents with    Follow-up     Fibroscan     Visit Vitals    BP 90/55 (BP 1 Location: Right arm, BP Patient Position: Sitting)    Pulse 76    Temp 97.7 °F (36.5 °C) (Tympanic)    Ht 5' 2\" (1.575 m)    Wt 143 lb (64.9 kg)    SpO2 98%    BMI 26.16 kg/m2     PHQ over the last two weeks 4/18/2018   Little interest or pleasure in doing things Not at all   Feeling down, depressed or hopeless Not at all   Total Score PHQ 2 0

## 2018-05-30 ENCOUNTER — OFFICE VISIT (OUTPATIENT)
Dept: OBGYN CLINIC | Age: 28
End: 2018-05-30

## 2018-05-30 VITALS
DIASTOLIC BLOOD PRESSURE: 68 MMHG | WEIGHT: 144 LBS | BODY MASS INDEX: 26.5 KG/M2 | HEIGHT: 62 IN | SYSTOLIC BLOOD PRESSURE: 116 MMHG

## 2018-05-30 DIAGNOSIS — Z01.419 ENCOUNTER FOR GYNECOLOGICAL EXAMINATION WITHOUT ABNORMAL FINDING: Primary | ICD-10-CM

## 2018-05-30 NOTE — MR AVS SNAPSHOT
[facility] [address] 1007 [facility] 
[phone] Patient: Valdez Gates MRN: [id number] :1990 Visit Information Date & Time Provider Department Dept. Phone Encounter #  
 2018  1:30 PM Carol Bella MD Flynn Knox [id number] Your Appointments 2018  1:30 PM  
Follow Up with ZUHAIR Dill 75 ([address]) Appt Note: Tx Follow up [address] [facility] [phone] [facility] [address]  
59 [facility] Adam [address] Upcoming Health Maintenance Date Due  
 HPV Age 9Y-34Y (1 of 1 - Female 3 Dose Series) 2002 Influenza Age 5 to Adult 2018 PAP AKA CERVICAL CYTOLOGY 2020 Allergies as of 2018  Review Complete On: 2018 By: Shawna Das No Known Allergies Current Immunizations  Never Reviewed Name Date Influenza Vaccine (Quad) PF 2017 Rho(D) Immune Globulin - IM 2017  1:04 PM  
  
 Not reviewed this visit Vitals BP Height(growth percentile) Weight(growth percentile) LMP BMI OB Status 116/68 (BP 1 Location: Left arm, BP Patient Position: Sitting) 5' 2\" (1.575 m) 144 lb (65.3 kg) (LMP Unknown) 26.34 kg/m2 Implant Smoking Status Former Smoker BMI and BSA Data Body Mass Index Body Surface Area  
 26.34 kg/m 2 1.69 m 2 Preferred Pharmacy Pharmacy Name Phone [address] [place] [address] Ro Guidry Said [phone] Your Updated Medication List  
  
   
This list is accurate as of 18  2:12 PM.  Always use your most recent med list.  
  
  
  
  
 glecaprevir-pibrentasvir 100-40 mg Tab Commonly known as:  Tee Rise Take 3 Tabs by mouth daily. Indications: CHRONIC HEPATITIS C - GENOTYPE 3 NEXPLANON 68 mg Impl Generic drug:  etonogestrel  
by SubDERmal route. PNV No12-Iron-FA-DSS-OM-3 29 mg iron-1 mg -50 mg Cpkd Take  by mouth. Patient Instructions Breast Self-Exam: Care Instructions Your Care Instructions A breast self-exam is when you check your breasts for lumps or changes. This regular exam helps you learn how your breasts normally look and feel. Most breast problems or changes are not because of cancer. Breast self-exam is not a substitute for a mammogram. Having regular breast exams by your doctor and regular mammograms improve your chances of finding any problems with your breasts. Some women set a time each month to do a step-by-step breast self-exam. Other women like a less formal system. They might look at their breasts as they brush their teeth, or feel their breasts once in a while in the shower. If you notice a change in your breast, tell your doctor. Follow-up care is a key part of your treatment and safety. Be sure to make and go to all appointments, and call your doctor if you are having problems. It's also a good idea to know your test results and keep a list of the medicines you take. How do you do a breast self-exam? 
· The best time to examine your breasts is usually one week after your menstrual period begins. Your breasts should not be tender then. If you do not have periods, you might do your exam on a day of the month that is easy to remember. · To examine your breasts: ¨ Remove all your clothes above the waist and lie down. When you are lying down, your breast tissue spreads evenly over your chest wall, which makes it easier to feel all your breast tissue. ¨ Use the pads-not the fingertips-of the 3 middle fingers of your left hand to check your right breast. Move your fingers slowly in small coin-sized circles that overlap. ¨ Use three levels of pressure to feel of all your breast tissue.  Use light pressure to feel the tissue close to the skin surface. Use medium pressure to feel a little deeper. Use firm pressure to feel your tissue close to your breastbone and ribs. Use each pressure level to feel your breast tissue before moving on to the next spot. ¨ Check your entire breast, moving up and down as if following a strip from the collarbone to the bra line, and from the armpit to the ribs. Repeat until you have covered the entire breast. 
¨ Repeat this procedure for your left breast, using the pads of the 3 middle fingers of your right hand. · To examine your breasts while in the shower: 
¨ Place one arm over your head and lightly soap your breast on that side. ¨ Using the pads of your fingers, gently move your hand over your breast (in the strip pattern described above), feeling carefully for any lumps or changes. ¨ Repeat for the other breast. 
· Have your doctor inspect anything you notice to see if you need further testing. Where can you learn more? Go to http://martin-andres.info/. Enter P148 in the search box to learn more about \"Breast Self-Exam: Care Instructions. \" Current as of: May 12, 2017 Content Version: 11.4 © 5263-7216 [employer]. Care instructions adapted under license by Mox[employer] (which disclaims liability or warranty for this information). If you have questions about a medical condition or this instruction, always ask your healthcare professional. [place] [address] any warranty or liability for your use of this information. Introducing \[facility]\"" & HEALTH SERVICES! Dear Tonia Aguilar: Thank you for requesting a [employer] account. Our records indicate that you already have an active Hyglos account. You can access your account anytime at [email or web address]. [email or web address] Did you know that you can access your hospital and ER discharge instructions at any time in Hyglos?   You can also review all of your test results from your hospital stay or ER visit. Additional Information If you have questions, please visit the Frequently Asked Questions section of the [employer] website at [email or web address] Remember, [employer] is NOT to be used for urgent needs. For medical emergencies, dial 911. Now available from your iPhone and Android! Please provide this summary of care documentation to your next provider. Your primary care clinician is listed as Renetta Leal. If you have any questions after today's visit, please call [phone].

## 2018-05-30 NOTE — PATIENT INSTRUCTIONS
Breast Self-Exam: Care Instructions  Your Care Instructions    A breast self-exam is when you check your breasts for lumps or changes. This regular exam helps you learn how your breasts normally look and feel. Most breast problems or changes are not because of cancer. Breast self-exam is not a substitute for a mammogram. Having regular breast exams by your doctor and regular mammograms improve your chances of finding any problems with your breasts. Some women set a time each month to do a step-by-step breast self-exam. Other women like a less formal system. They might look at their breasts as they brush their teeth, or feel their breasts once in a while in the shower. If you notice a change in your breast, tell your doctor. Follow-up care is a key part of your treatment and safety. Be sure to make and go to all appointments, and call your doctor if you are having problems. It's also a good idea to know your test results and keep a list of the medicines you take. How do you do a breast self-exam?  · The best time to examine your breasts is usually one week after your menstrual period begins. Your breasts should not be tender then. If you do not have periods, you might do your exam on a day of the month that is easy to remember. · To examine your breasts:  ¨ Remove all your clothes above the waist and lie down. When you are lying down, your breast tissue spreads evenly over your chest wall, which makes it easier to feel all your breast tissue. ¨ Use the pads-not the fingertips-of the 3 middle fingers of your left hand to check your right breast. Move your fingers slowly in small coin-sized circles that overlap. ¨ Use three levels of pressure to feel of all your breast tissue. Use light pressure to feel the tissue close to the skin surface. Use medium pressure to feel a little deeper. Use firm pressure to feel your tissue close to your breastbone and ribs.  Use each pressure level to feel your breast tissue before moving on to the next spot. ¨ Check your entire breast, moving up and down as if following a strip from the collarbone to the bra line, and from the armpit to the ribs. Repeat until you have covered the entire breast.  ¨ Repeat this procedure for your left breast, using the pads of the 3 middle fingers of your right hand. · To examine your breasts while in the shower:  ¨ Place one arm over your head and lightly soap your breast on that side. ¨ Using the pads of your fingers, gently move your hand over your breast (in the strip pattern described above), feeling carefully for any lumps or changes. ¨ Repeat for the other breast.  · Have your doctor inspect anything you notice to see if you need further testing. Where can you learn more? Go to http://martin-andres.info/. Enter P148 in the search box to learn more about \"Breast Self-Exam: Care Instructions. \"  Current as of: May 12, 2017  Content Version: 11.4  © 3814-5998 Healthwise, Incorporated. Care instructions adapted under license by ebridge (which disclaims liability or warranty for this information). If you have questions about a medical condition or this instruction, always ask your healthcare professional. [place] [address] any warranty or liability for your use of this information.

## 2018-05-30 NOTE — PROGRESS NOTES
Loly Ibarra is a ,  33 y.o. female WHITE OR  whose No LMP recorded (lmp unknown). Patient has had an implant. was on  who presents for her annual checkup. She is having concerns about her menstrual cycle. c/o irregular bleeding that lasted for a month. . On meds for Hep C    With regard to the Gardisil vaccine, she is older than the FDA approved age to receive it. Menstrual status:    Her periods are heavy in flow. She is using five to ten pads or tampons per day, periods are irregular and lasting for a month at a time. She denies dysmenorrhea. She reports no premenstrual symptoms. Contraception:    The current method of family planning is nexplanon. Sexual history:    She  reports that she does not currently engage in sexual activity but has had male partners.; She reports using the following method of birth control/protection: None. Medical conditions:    Since her last annual GYN exam about one year ago, she has not the following changes in her health history: none. Pap and Mammogram History:    Her most recent Pap smear was normal obtained . Scanned under media. The patient has never had a mammogram.    The patient does not have a family history of breast cancer. Past Medical History:   Diagnosis Date    Constipation     GERD (gastroesophageal reflux disease)     Hepatitis C     Intravenous drug abuse complicating pregnancy     Heroin and Meth    Nexplanon in place 2018    Nexplanon placed     No past surgical history on file. Current Outpatient Prescriptions   Medication Sig Dispense Refill    glecaprevir-pibrentasvir (MAVYRET) 100-40 mg tab Take 3 Tabs by mouth daily. Indications: CHRONIC HEPATITIS C - GENOTYPE 3 84 Tab 1    etonogestrel (NEXPLANON) 68 mg impl by SubDERmal route.  PNV No12-Iron-FA-DSS-OM-3 29 mg iron-1 mg -50 mg CPKD Take  by mouth. Allergies: Review of patient's allergies indicates no known allergies.    Social History     Social History    Marital status: SINGLE     Spouse name: N/A    Number of children: N/A    Years of education: N/A     Occupational History    Not on file. Social History Main Topics    Smoking status: Former Smoker    Smokeless tobacco: Never Used    Alcohol use No    Drug use: Yes     Special: Methamphetamines, Heroin    Sexual activity: Not Currently     Partners: Male     Birth control/ protection: None     Other Topics Concern    Not on file     Social History Narrative     Tobacco History:  reports that she has quit smoking. She has never used smokeless tobacco.  Alcohol Abuse:  reports that she does not drink alcohol. Drug Abuse:  reports that she uses illicit drugs, including Methamphetamines and Heroin.     Patient Active Problem List   Diagnosis Code    Chronic hepatitis C without hepatic coma (HCC) B18.2       Review of Systems - History obtained from the patient  Constitutional: negative for weight loss, fever, night sweats  HEENT: negative for hearing loss, earache, congestion, snoring, sorethroat  CV: negative for chest pain, palpitations, edema  Resp: negative for cough, shortness of breath, wheezing  GI: negative for change in bowel habits, abdominal pain, black or bloody stools  : negative for frequency, dysuria, hematuria, vaginal discharge  MSK: negative for back pain, joint pain, muscle pain  Breast: negative for breast lumps, nipple discharge, galactorrhea  Skin :negative for itching, rash, hives  Neuro: negative for dizziness, headache, confusion, weakness  Psych: negative for anxiety, depression, change in mood  Heme/lymph: negative for bleeding, bruising, pallor    Physical Exam    Visit Vitals    /68 (BP 1 Location: Left arm, BP Patient Position: Sitting)    Ht 5' 2\" (1.575 m)    Wt 144 lb (65.3 kg)    LMP  (LMP Unknown)    BMI 26.34 kg/m2       Constitutional  · Appearance: well-nourished, well developed, alert, in no acute distress    HENT  · Head and Face: appears normal    Neck  · Inspection/Palpation: normal appearance, no masses or tenderness  · Lymph Nodes: no lymphadenopathy present  · Thyroid: gland size normal, nontender, no nodules or masses present on palpation    Chest  · Respiratory Effort: breathing normal  · Auscultation: normal breath sounds    Cardiovascular  · Heart:  · Auscultation: regular rate and rhythm without murmur    Breasts  · Inspection of Breasts: breasts symmetrical, no skin changes, no discharge present, nipple appearance normal, no skin retraction present  · Palpation of Breasts and Axillae: no masses present on palpation, no breast tenderness  · Axillary Lymph Nodes: no lymphadenopathy present    Gastrointestinal  · Abdominal Examination: abdomen non-tender to palpation, normal bowel sounds, no masses present  · Liver and spleen: no hepatomegaly present, spleen not palpable  · Hernias: no hernias identified    Genitourinary  · External Genitalia: normal appearance for age, no discharge present, no tenderness present, no inflammatory lesions present, no masses present, no atrophy present  · Vagina: normal vaginal vault without central or paravaginal defects, no discharge present, no inflammatory lesions present, no masses present  · Bladder: non-tender to palpation  · Urethra: appears normal  · Cervix: normal   · Uterus: normal size, shape and consistency  · Adnexa: no adnexal tenderness present, no adnexal masses present  · Perineum: perineum within normal limits, no evidence of trauma, no rashes or skin lesions present  · Anus: anus within normal limits, no hemorrhoids present  · Inguinal Lymph Nodes: no lymphadenopathy present    Skin  · General Inspection: no rash, no lesions identified    Neurologic/Psychiatric  · Mental Status:  · Orientation: grossly oriented to person, place and time  · Mood and Affect: mood normal, affect appropriate  No results found for this visit on 05/30/18.     .  Assessment:  Routine gynecologic examination  Irregular bleeding with Nexplanon    Plan:  Counseled re: diet, exercise, healthy lifestyle  Return for yearly wellness visits  Disc Mirena if she want to stop the irregular bleeding

## 2018-05-31 ENCOUNTER — OFFICE VISIT (OUTPATIENT)
Dept: HEMATOLOGY | Age: 28
End: 2018-05-31

## 2018-05-31 VITALS
OXYGEN SATURATION: 97 % | DIASTOLIC BLOOD PRESSURE: 63 MMHG | HEART RATE: 94 BPM | TEMPERATURE: 98 F | SYSTOLIC BLOOD PRESSURE: 120 MMHG | WEIGHT: 142 LBS | BODY MASS INDEX: 25.97 KG/M2

## 2018-05-31 DIAGNOSIS — B18.2 CHRONIC HEPATITIS C WITHOUT HEPATIC COMA (HCC): Primary | ICD-10-CM

## 2018-05-31 NOTE — PROGRESS NOTES
70 Antonieta Jorgensen MD, Jaun Mason, Flakito Galindo, [place]       ZUHAIR Vines PA-C Katharine Keepers, [facility]-BC   Pravin Edmonds, ZUHAIR Alves, ZUHAIR Gant [facility] 136    at [address], [facility] Chad Malagon  22.    [phone]    FAX: [address] [place] [address], [address]    [phone]    FAX: [phone]     Patient Care Team:  Alida Ramirez MD as PCP - General (Obstetrics & Gynecology)    Problem List  Date Reviewed: 5/30/2018          Codes Class Noted    Chronic hepatitis C without hepatic coma [facility]) ICD-10-CM: B18.2  ICD-9-CM: 070.54  3/16/2018            Chasity Richter \"Anel\" returns to the [address] for management of chronic HCV. The active problem list, all pertinent past medical history, medications, liver histology, radiologic findings and laboratory findings related to the liver disorder were reviewed with the patient. The patient is a 33 y.o.  female who was noted to have abnormalities in liver chemistries tested positive for chronic HCV in 6/2016. Imaging of the liver with ultrasound shows a normal appearing liver. An assessment of liver fibrosis with elastography shows no fibrosis. The patient is currently on 8 weeks of therapy with Mavyret. She is tolerating the medication fine with no side effects. The patient has no symptoms which can be attributed to the liver disorder. The patient has not experienced fatigue or pain in the right side over the liver. The patient completes all daily activities without any functional limitations. Her son Rob Perdomo is now 7 months old. She is not breastfeeding.     ALLERGIES  No Known Allergies    MEDICATIONS  Current Outpatient Prescriptions   Medication Sig    glecaprevir-pibrentasvir (MAVYRET) 100-40 mg tab Take 3 Tabs by mouth daily. Indications: CHRONIC HEPATITIS C - GENOTYPE 3    etonogestrel (NEXPLANON) 68 mg impl by SubDERmal route.  PNV No12-Iron-FA-DSS-OM-3 29 mg iron-1 mg -50 mg CPKD Take  by mouth. No current facility-administered medications for this visit. SYSTEM REVIEW NOT RELATED TO LIVER DISEASE OR REVIEWED ABOVE:  Constitution systems: Negative for fever, chills, weight gain, weight loss. Eyes: Negative for visual changes. ENT: Negative for sore throat, painful swallowing. Respiratory: Negative for cough, hemoptysis, SOB. Cardiology: Negative for chest pain, palpitations. GI:  Negative for constipation or diarrhea. : Negative for urinary frequency, dysuria, hematuria, nocturia. Skin: Negative for rash. Hematology: Negative for easy bruising, blood clots. Musculo-skeletal: Negative for back pain, muscle pain, weakness. Neurologic: Negative for headaches, dizziness, vertigo, memory problems not related to HE. Psychology: Negative for anxiety, depression. FAMILY HISTORY:  The father is alive and healthy. The mother is alive and healthy. There is no family history of liver disease. SOCIAL HISTORY:  The patient has never been . The patient has 1 child. The patient currently smokes 2 cigarettes daily. 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 The patient has never consumed significant amounts of alcohol. The patient currently is a part time student in drug addiction. PHYSICAL EXAMINATION:  Visit Vitals    LMP  (LMP Unknown)     General: No acute distress. Eyes: Sclera anicteric. ENT: No oral lesions. Nodes: No adenopathy. Skin: No spider angiomata. No jaundice. No palmar erythema. Respiratory: Lungs clear to auscultation. Cardiovascular: Regular heart rate. No murmurs. No JVD.   Abdomen: Soft non-tender. Liver size normal to percussion/palpation. Spleen not palpable. No obvious ascites. Extremities: No edema. No muscle wasting. No gross arthritic changes. Neurologic: Alert and oriented. Cranial nerves grossly intact. No asterixis. LABORATORY STUDIES:  Liver [place] of [address] Units 3/16/2018   WBC 3.4 - 10.8 x10E3/uL 6.8   ANC 1.4 - 7.0 x10E3/uL 4.0   HGB 11.1 - 15.9 g/dL 13.2    - 379 x10E3/uL 248   AST 0 - 40 IU/L 21   ALT 0 - 32 IU/L 25   Alk Phos 39 - 117 IU/L 67   Bili, Total 0.0 - 1.2 mg/dL 0.4   Bili, Direct 0.00 - 0.40 mg/dL 0.12   Albumin 3.5 - 5.5 g/dL 4.2   BUN 6 - 20 mg/dL 11   Creat 0.57 - 1.00 mg/dL 0.77   Na 134 - 144 mmol/L 143   K 3.5 - 5.2 mmol/L 4.2   Cl 96 - 106 mmol/L 103   CO2 18 - 29 mmol/L 25   Glucose 65 - 99 mg/dL 80     SEROLOGIES:  Serologies Latest Ref Rng & Units 3/16/2018 12/4/2017   Hep A Ab, Total Negative Positive (A)    Hep B Surface Ag Negative Negative    Hep B Core Ab, Total Negative Negative    Hep B Surface AB QL  Reactive    HCV RT-PCR, Quant IU/mL  [id number]   HCV Log10 log10 IU/mL  5.855     LIVER HISTOLOGY:  4/2018. FibroScan performed at [address]. EkPa was 2.8. IQR/med 18%. The results suggested a fibrosis level of F0. CAP is 238, inconsistent with fatty liver disease. ENDOSCOPIC PROCEDURES:  Not available or performed    RADIOLOGY:  4/2018. Normal appearing liver. No masses or lesions. OTHER TESTING:  Not available or performed    ASSESSMENT AND PLAN:  Chronic HCV with no fibrosis. Liver function is normal.  The platelet count is normal.      The patient is currently on 8 weeks of [address]. The patient was directed to continue all current medications at the current dosages. There are no contraindications for the patient to take any medications that are necessary for treatment of other medical issues. The patient was counseled regarding alcohol consumption.       She recently delivered her first baby 6 months ago. Discussed the risk of vertical transmission which is very low. Recommend the baby get tested at the age of 2 years. This is the length of time the baby can spontaneously clear the virus. This will ensure no false positives. Her partner is also HCV positive. She asked about sexual contact. There is a small risk of sexual transmission and I would advise condom use until he is on treatment. He is scheduled to see me in June. Vaccination for viral hepatitis A and B is not needed. The patient has serologic evidence of prior exposure or vaccination with immunity. All of the above issues were discussed with the patient. All questions were answered. The patient expressed a clear understanding of the above. [facility] 87 in 4 months for SVR. I will mail her a slip if she needs a viral load checked before then.      ALEN Leigh-BC  Liver [place] [facility] [address] [place] [address], [address] [address], [place]  22.  [phone]

## 2018-05-31 NOTE — PROGRESS NOTES
1. Have you been to the ER, urgent care clinic since your last visit? Hospitalized since your last visit? No    2. Have you seen or consulted any other health care providers outside of the [address] [place] [address] since your last visit? Include any pap smears or colon screening. No   Chief Complaint   Patient presents with    Follow-up     Visit Vitals    /63 (BP 1 Location: Left arm, BP Patient Position: Sitting)    Pulse 94    Temp 98 °F (36.7 °C) (Tympanic)    Wt 142 lb (64.4 kg)    SpO2 97%    BMI 25.97 kg/m2     PHQ over the last two weeks 5/31/2018   Little interest or pleasure in doing things Not at all   Feeling down, depressed or hopeless Not at all   Total Score PHQ 2 0     Learning Assessment 5/31/2018   PRIMARY LEARNER Patient   BARRIERS PRIMARY LEARNER NONE   CO-LEARNER CAREGIVER No   PRIMARY LANGUAGE ENGLISH   LEARNER PREFERENCE PRIMARY LISTENING   ANSWERED BY patient   RELATIONSHIP SELF     Abuse Screening Questionnaire 5/31/2018   Do you ever feel afraid of your partner? N   Are you in a relationship with someone who physically or mentally threatens you? N   Is it safe for you to go home?  Suzanne Davalos

## 2018-05-31 NOTE — MR AVS SNAPSHOT
1111 [facility] .[id number] 1400 [address] [place] [address] 
[phone] Patient: Almaz Simmons MRN: [id number] :1990 Visit Information Date & Time Provider Department Dept. Phone Encounter #  
 2018  1:30 PM [facility] JULIUS Salamanca, 3687 [facility] [id number] Follow-up Instructions Return in about 4 weeks (around 2018) for end of treatment nellie. Your Appointments 2018  9:30 AM  
Follow Up with [facility] ZUHAIR Zamudiofnarneymar 75 ([facility]) Appt Note: Follow up 200 [facility] .[phone] [facility] [address]  
59 [facility] [address] Upcoming Health Maintenance Date Due  
 HPV Age 9Y-34Y (1 of 1 - Female 3 Dose Series) 2002 Pneumococcal 19-64 Medium Risk (1 of 1 - PPSV23) 2010 DTaP/Tdap/Td series (1 - Tdap) 2012 Influenza Age 5 to Adult 2018 PAP AKA CERVICAL CYTOLOGY 2020 Allergies as of 2018  Review Complete On: 2018 By: Neva Valencia. Taya Salamanca NP No Known Allergies Current Immunizations  Never Reviewed Name Date Influenza Vaccine (Quad) PF 2017 Rho(D) Immune Globulin - IM 2017  1:04 PM  
  
 Not reviewed this visit You Were Diagnosed With   
  
 Codes Comments Chronic hepatitis C without hepatic coma (HCC)    -  Primary ICD-10-CM: B18.2 ICD-9-CM: 070.54 Vitals BP Pulse Temp Weight(growth percentile) LMP SpO2  
 120/63 (BP 1 Location: Left arm, BP Patient Position: Sitting) 94 98 °F (36.7 °C) (Tympanic) 142 lb (64.4 kg) (LMP Unknown) 97% BMI OB Status Smoking Status 25.97 kg/m2 Implant Former Smoker BMI and BSA Data Body Mass Index Body Surface Area  
 25.97 kg/m 2 1.68 m 2 Preferred Pharmacy Pharmacy Name Phone  5542 [facility], [address] [place] [address] [phone] Your Updated Medication List  
  
   
This list is accurate as of 5/31/18  1:50 PM.  Always use your most recent med list.  
  
  
  
  
 glecaprevir-pibrentasvir 100-40 mg Tab Commonly known as:  Austin Branden Take 3 Tabs by mouth daily. Indications: CHRONIC HEPATITIS C - GENOTYPE 3 NEXPLANON 68 mg Impl Generic drug:  etonogestrel  
by SubDERmal route. PNV No12-Iron-FA-DSS-OM-3 29 mg iron-1 mg -50 mg Cpkd Take  by mouth. We Performed the Following CBC W/O DIFF [53894 CPT(R)] HCV RNA BY JOVANNA QL,RFLX TO QT [36935 CPT(R)] HEPATIC FUNCTION PANEL [75839 CPT(R)] METABOLIC PANEL, BASIC [29530 CPT(R)] Follow-up Instructions Return in about 4 weeks (around 6/28/2018) for end of treatment nellie. Introducing \[facility] [place] [facility]\"" & [facility] SERVICES! Dear Srinivasa Cordero: Thank you for requesting a [email or web address] account. Our records indicate that you already have an active [email or web address] account. You can access your account anytime at [email or web address] [employer] Did you know that you can access your hospital and ER discharge instructions at any time in [email or web address]? You can also review all of your test results from your hospital stay or ER visit. Additional Information If you have questions, please visit the Frequently Asked Questions section of the [email or web address] website at [email or web address] [email or web address] Remember, [email or web address] is NOT to be used for urgent needs. For medical emergencies, dial 911. Now available from your iPhone and Android! Please provide this summary of care documentation to your next provider. Your primary care clinician is listed as Zach Gomez. If you have any questions after today's visit, please call [phone].

## 2018-06-01 LAB
ALBUMIN SERPL-MCNC: 4.8 G/DL (ref 3.5–5.5)
ALP SERPL-CCNC: 92 IU/L (ref 39–117)
ALT SERPL-CCNC: 15 IU/L (ref 0–32)
AST SERPL-CCNC: 19 IU/L (ref 0–40)
BILIRUB DIRECT SERPL-MCNC: 0.18 MG/DL (ref 0–0.4)
BILIRUB SERPL-MCNC: 0.6 MG/DL (ref 0–1.2)
BUN SERPL-MCNC: 8 MG/DL (ref 6–20)
BUN/CREAT SERPL: 11 (ref 9–23)
CALCIUM SERPL-MCNC: 9.5 MG/DL (ref 8.7–10.2)
CHLORIDE SERPL-SCNC: 102 MMOL/L (ref 96–106)
CO2 SERPL-SCNC: 25 MMOL/L (ref 18–29)
CREAT SERPL-MCNC: 0.7 MG/DL (ref 0.57–1)
CYTOLOGIST CVX/VAG CYTO: NORMAL
CYTOLOGY CVX/VAG DOC THIN PREP: NORMAL
CYTOLOGY HISTORY:: NORMAL
DX ICD CODE: NORMAL
DX ICD CODE: NORMAL
ERYTHROCYTE [DISTWIDTH] IN BLOOD BY AUTOMATED COUNT: 14.2 % (ref 12.3–15.4)
GFR SERPLBLD CREATININE-BSD FMLA CKD-EPI: 120 ML/MIN/1.73
GFR SERPLBLD CREATININE-BSD FMLA CKD-EPI: 138 ML/MIN/1.73
GLUCOSE SERPL-MCNC: 93 MG/DL (ref 65–99)
HCT VFR BLD AUTO: 42.4 % (ref 34–46.6)
HGB BLD-MCNC: 14.6 G/DL (ref 11.1–15.9)
LABCORP, 190119: NORMAL
Lab: NORMAL
MCH RBC QN AUTO: 32 PG (ref 26.6–33)
MCHC RBC AUTO-ENTMCNC: 34.4 G/DL (ref 31.5–35.7)
MCV RBC AUTO: 93 FL (ref 79–97)
OTHER STN SPEC: NORMAL
PATH REPORT.FINAL DX SPEC: NORMAL
PLATELET # BLD AUTO: 262 X10E3/UL (ref 150–379)
POTASSIUM SERPL-SCNC: 4.3 MMOL/L (ref 3.5–5.2)
PROT SERPL-MCNC: 7.5 G/DL (ref 6–8.5)
RBC # BLD AUTO: 4.56 X10E6/UL (ref 3.77–5.28)
SODIUM SERPL-SCNC: 143 MMOL/L (ref 134–144)
STAT OF ADQ CVX/VAG CYTO-IMP: NORMAL
WBC # BLD AUTO: 8.1 X10E3/UL (ref 3.4–10.8)

## 2018-06-04 LAB — HCV RNA SERPL QL NAA+PROBE: NEGATIVE

## 2018-09-27 ENCOUNTER — OFFICE VISIT (OUTPATIENT)
Dept: HEMATOLOGY | Age: 28
End: 2018-09-27

## 2018-09-27 VITALS
DIASTOLIC BLOOD PRESSURE: 59 MMHG | SYSTOLIC BLOOD PRESSURE: 99 MMHG | TEMPERATURE: 98 F | WEIGHT: 139 LBS | HEART RATE: 60 BPM | BODY MASS INDEX: 25.42 KG/M2

## 2018-09-27 DIAGNOSIS — B18.2 CHRONIC HEPATITIS C WITHOUT HEPATIC COMA (HCC): Primary | ICD-10-CM

## 2018-09-27 NOTE — PROGRESS NOTES
1. Have you been to the ER, urgent care clinic since your last visit? Hospitalized since your last visit? No    2. Have you seen or consulted any other health care providers outside of the [address] [place] [address] since your last visit? Include any pap smears or colon screening. No   Chief Complaint   Patient presents with    Follow-up     Visit Vitals    BP 99/59 (BP 1 Location: Left arm, BP Patient Position: Sitting)    Pulse 60    Temp 98 °F (36.7 °C) (Tympanic)    Wt 139 lb (63 kg)    BMI 25.42 kg/m2     PHQ over the last two weeks 9/27/2018   Little interest or pleasure in doing things Not at all   Feeling down, depressed, irritable, or hopeless Not at all   Total Score PHQ 2 0     Learning Assessment 9/27/2018   PRIMARY LEARNER Patient   BARRIERS PRIMARY LEARNER NONE   CO-LEARNER CAREGIVER No   PRIMARY LANGUAGE ENGLISH   LEARNER PREFERENCE PRIMARY LISTENING   ANSWERED BY patient   RELATIONSHIP SELF     Abuse Screening Questionnaire 9/27/2018   Do you ever feel afraid of your partner? N   Are you in a relationship with someone who physically or mentally threatens you? N   Is it safe for you to go home?  Larissa Marvin

## 2018-09-27 NOTE — PROGRESS NOTES
70 Antonieta Jorgensen MD, [address], [facility] [place]       Srikanth Novoa, DARBY Majano, [facility]   Ridge Nunez, ZUHAIR [facility] [address]    at [address], [address] Chad Malagon  22.    [phone]    FAX: [address] [place] [address]    at [facility], [address] [place] [address], [address]    [phone]    FAX: [phone]     Patient Care Team:  Shawn Borja MD as PCP - General (Obstetrics & Gynecology)    Problem List  Date Reviewed: 5/31/2018          Codes Class Noted    Chronic hepatitis C without hepatic coma [facility]) ICD-10-CM: B18.2  ICD-9-CM: 070.54  3/16/2018            Pepper Finders \"Anel\" returns to the [address] for management of chronic HCV. The active problem list, all pertinent past medical history, medications, liver histology, radiologic findings and laboratory findings related to the liver disorder were reviewed with the patient. The patient is a 33 y.o.  female who was noted to have abnormalities in liver chemistries tested positive for chronic HCV in 6/2016. Imaging of the liver with ultrasound shows a normal appearing liver. An assessment of liver fibrosis with elastography shows no fibrosis. The patient has completed 8 weeks of therapy with Mavyret. This is her SVR appointment. The patient has no symptoms which can be attributed to the liver disorder. The patient has not experienced fatigue or pain in the right side over the liver. The patient completes all daily activities without any functional limitations. Her son Pilo Paredes is now 5 months old.      ALLERGIES  No Known Allergies    MEDICATIONS  Current Outpatient Prescriptions Medication Sig    etonogestrel (NEXPLANON) 68 mg impl by SubDERmal route.  PNV No12-Iron-FA-DSS-OM-3 29 mg iron-1 mg -50 mg CPKD Take  by mouth. No current facility-administered medications for this visit. SYSTEM REVIEW NOT RELATED TO LIVER DISEASE OR REVIEWED ABOVE:  Constitution systems: Negative for fever, chills, weight gain, weight loss. Eyes: Negative for visual changes. ENT: Negative for sore throat, painful swallowing. Respiratory: Negative for cough, hemoptysis, SOB. Cardiology: Negative for chest pain, palpitations. GI:  Negative for constipation or diarrhea. : Negative for urinary frequency, dysuria, hematuria, nocturia. Skin: Negative for rash. Hematology: Negative for easy bruising, blood clots. Musculo-skeletal: Negative for back pain, muscle pain, weakness. Neurologic: Negative for headaches, dizziness, vertigo, memory problems not related to HE. Psychology: Negative for anxiety, depression. FAMILY HISTORY:  The father is alive and healthy. The mother is alive and healthy. There is no family history of liver disease. SOCIAL HISTORY:  The patient has never been . The patient has 1 child. The patient currently smokes 2 cigarettes daily. 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 The patient has never consumed significant amounts of alcohol. The patient currently is a part time student in drug addiction. PHYSICAL EXAMINATION:  Visit Vitals    BP 99/59 (BP 1 Location: Left arm, BP Patient Position: Sitting)    Pulse 60    Temp 98 °F (36.7 °C) (Tympanic)    Wt 139 lb (63 kg)    BMI 25.42 kg/m2     General: No acute distress. Eyes: Sclera anicteric. ENT: No oral lesions. Nodes: No adenopathy. Skin: No spider angiomata. No jaundice. No palmar erythema. Respiratory: Lungs clear to auscultation. Cardiovascular: Regular heart rate. No murmurs. No JVD. Abdomen: Soft non-tender.   Liver size normal to percussion/palpation. Spleen not palpable. No obvious ascites. Extremities: No edema. No muscle wasting. No gross arthritic changes. Neurologic: Alert and oriented. Cranial nerves grossly intact. No asterixis. LABORATORY STUDIES:  Liver [place] of [address] Units 3/16/2018   WBC 3.4 - 10.8 x10E3/uL 6.8   ANC 1.4 - 7.0 x10E3/uL 4.0   HGB 11.1 - 15.9 g/dL 13.2    - 379 x10E3/uL 248   AST 0 - 40 IU/L 21   ALT 0 - 32 IU/L 25   Alk Phos 39 - 117 IU/L 67   Bili, Total 0.0 - 1.2 mg/dL 0.4   Bili, Direct 0.00 - 0.40 mg/dL 0.12   Albumin 3.5 - 5.5 g/dL 4.2   BUN 6 - 20 mg/dL 11   Creat 0.57 - 1.00 mg/dL 0.77   Na 134 - 144 mmol/L 143   K 3.5 - 5.2 mmol/L 4.2   Cl 96 - 106 mmol/L 103   CO2 18 - 29 mmol/L 25   Glucose 65 - 99 mg/dL 80     SEROLOGIES:  Serologies Latest Ref Rng & Units 3/16/2018 12/4/2017   Hep A Ab, Total Negative Positive (A)    Hep B Surface Ag Negative Negative    Hep B Core Ab, Total Negative Negative    Hep B Surface AB QL  Reactive    HCV RT-PCR, Quant IU/mL  443217   HCV Log10 log10 IU/mL  5.855     LIVER HISTOLOGY:  4/2018. [facility]. EkPa was 2.8. IQR/med 18%. The results suggested a fibrosis level of F0. CAP is 238, inconsistent with fatty liver disease. ENDOSCOPIC PROCEDURES:  Not available or performed    RADIOLOGY:  4/2018. Normal appearing liver. No masses or lesions. OTHER TESTING:  Not available or performed    ASSESSMENT AND PLAN:  Chronic HCV with no fibrosis. Liver function is normal.  The platelet count is normal.      The patient has completed 8 weeks of therapy with Mavyret. This is her SVR visit. The patient was directed to continue all current medications at the current dosages. There are no contraindications for the patient to take any medications that are necessary for treatment of other medical issues. The patient was counseled regarding alcohol consumption.       She recently delivered her first baby 10 months ago. Discussed the risk of vertical transmission which is very low. Recommend the baby get tested at the age of 2 years. This is the length of time the baby can spontaneously clear the virus. This will ensure no false positives. Vaccination for viral hepatitis A and B is not needed. The patient has serologic evidence of prior exposure or vaccination with immunity. All of the above issues were discussed with the patient. All questions were answered. The patient expressed a clear understanding of the above. 1901 [facility] 87 in 6 months.      ALEN Green-BC  Liver [place] of [facility] [address] [place] [address], [address] [facility], [facility] 22.  [phone]

## 2018-09-27 NOTE — MR AVS SNAPSHOT
2700 [facility] .[id number] [address] [place] [address] 
[phone] Patient: Edy Honeycutt MRN: [id number] :1990 Visit Information Date & Time Provider Department Dept. Phone Encounter #  
 2018 12:45 PM Miles Khan, [address] [facility] 219 [id number] Your Appointments 3/27/2019  1:15 PM  
Follow Up with Miles Khan, NP [address] (3651 [facility]) Appt Note: 6 month follow up 200 [facility] .[phone] [facility] [address]  
59 [facility] [address] Upcoming Health Maintenance Date Due  
 HPV Age 9Y-34Y (1 of 1 - Female 3 Dose Series) 2002 Pneumococcal 19-64 Medium Risk (1 of 1 - PPSV23) 2010 DTaP/Tdap/Td series (1 - Tdap) 2012 Influenza Age 5 to Adult 2018 PAP AKA CERVICAL CYTOLOGY 2021 Allergies as of 2018  Review Complete On: 2018 By: Kelechi Mobley No Known Allergies Current Immunizations  Never Reviewed Name Date Influenza Vaccine (Quad) PF 2017 Rho(D) Immune Globulin - IM 2017  1:04 PM  
  
 Not reviewed this visit Vitals BP Pulse Temp Weight(growth percentile) BMI OB Status 99/59 (BP 1 Location: Left arm, BP Patient Position: Sitting) 60 98 °F (36.7 °C) (Tympanic) 139 lb (63 kg) 25.42 kg/m2 Implant Smoking Status Former Smoker BMI and BSA Data Body Mass Index Body Surface Area  
 25.42 kg/m 2 1.66 m 2 Preferred Pharmacy Pharmacy Name Phone [address] [place] [address], [address] [place] [address] 23 Ro Guidry Said [phone] Your Updated Medication List  
  
   
This list is accurate as of 18  1:05 PM.  Always use your most recent med list.  
  
  
  
  
 Antoniette Davidcher 68 mg Impl Generic drug:  etonogestrel  
by SubDERmal route. PNV No12-Iron-FA-DSS-OM-3 29 mg iron-1 mg -50 mg Cpkd Take  by mouth. Introducing [facility] & [facility] SERVICES! Dear Gt Parsons: Thank you for requesting a Globitel account. Our records indicate that you already have an active Globitel account. You can access your account anytime at [email or web address] Did you know that you can access your hospital and ER discharge instructions at any time in Globitel? You can also review all of your test results from your hospital stay or ER visit. Additional Information If you have questions, please visit the Frequently Asked Questions section of the Globitel website at [email or web address] Remember, Globitel is NOT to be used for urgent needs. For medical emergencies, dial 911. Now available from your iPhone and Android! Please provide this summary of care documentation to your next provider. Your primary care clinician is listed as Silvia Moran. If you have any questions after today's visit, please call [phone].

## 2018-09-28 LAB
ALBUMIN SERPL-MCNC: 4.3 G/DL (ref 3.5–5.5)
ALP SERPL-CCNC: 57 IU/L (ref 39–117)
ALT SERPL-CCNC: 10 IU/L (ref 0–32)
AST SERPL-CCNC: 14 IU/L (ref 0–40)
BILIRUB DIRECT SERPL-MCNC: 0.11 MG/DL (ref 0–0.4)
BILIRUB SERPL-MCNC: 0.4 MG/DL (ref 0–1.2)
BUN SERPL-MCNC: 4 MG/DL (ref 6–20)
BUN/CREAT SERPL: 7 (ref 9–23)
CALCIUM SERPL-MCNC: 9 MG/DL (ref 8.7–10.2)
CHLORIDE SERPL-SCNC: 103 MMOL/L (ref 96–106)
CO2 SERPL-SCNC: 24 MMOL/L (ref 20–29)
CREAT SERPL-MCNC: 0.6 MG/DL (ref 0.57–1)
ERYTHROCYTE [DISTWIDTH] IN BLOOD BY AUTOMATED COUNT: 13.2 % (ref 12.3–15.4)
GLUCOSE SERPL-MCNC: 93 MG/DL (ref 65–99)
HCT VFR BLD AUTO: 38.9 % (ref 34–46.6)
HGB BLD-MCNC: 13 G/DL (ref 11.1–15.9)
MCH RBC QN AUTO: 30.4 PG (ref 26.6–33)
MCHC RBC AUTO-ENTMCNC: 33.4 G/DL (ref 31.5–35.7)
MCV RBC AUTO: 91 FL (ref 79–97)
PLATELET # BLD AUTO: 251 X10E3/UL (ref 150–379)
POTASSIUM SERPL-SCNC: 3.8 MMOL/L (ref 3.5–5.2)
PROT SERPL-MCNC: 6.9 G/DL (ref 6–8.5)
RBC # BLD AUTO: 4.27 X10E6/UL (ref 3.77–5.28)
SODIUM SERPL-SCNC: 139 MMOL/L (ref 134–144)
WBC # BLD AUTO: 5.2 X10E3/UL (ref 3.4–10.8)

## 2018-09-30 LAB — HCV RNA SERPL QL NAA+PROBE: NEGATIVE

## 2019-03-27 ENCOUNTER — OFFICE VISIT (OUTPATIENT)
Dept: HEMATOLOGY | Age: 29
End: 2019-03-27

## 2019-03-27 VITALS
TEMPERATURE: 97.8 F | HEIGHT: 62 IN | HEART RATE: 74 BPM | WEIGHT: 140 LBS | DIASTOLIC BLOOD PRESSURE: 60 MMHG | SYSTOLIC BLOOD PRESSURE: 101 MMHG | BODY MASS INDEX: 25.76 KG/M2

## 2019-03-27 DIAGNOSIS — B18.2 CHRONIC HEPATITIS C WITHOUT HEPATIC COMA (HCC): Primary | ICD-10-CM

## 2019-03-27 DIAGNOSIS — Z86.19 HEPATITIS C VIRUS INFECTION CURED AFTER ANTIVIRAL DRUG THERAPY: ICD-10-CM

## 2019-03-27 NOTE — PROGRESS NOTES
1. Have you been to the ER, urgent care clinic since your last visit? Hospitalized since your last visit? No    2. Have you seen or consulted any other health care providers outside of the [address] [place] [address] since your last visit? Include any pap smears or colon screening. No     Chief Complaint   Patient presents with    Follow-up     Visit Vitals  /60 (BP 1 Location: Left arm, BP Patient Position: Sitting)   Pulse 74   Temp 97.8 °F (36.6 °C) (Tympanic)   Ht 5' 2\" (1.575 m)   Wt 140 lb (63.5 kg)   BMI 25.61 kg/m²     3 most recent PHQ Screens 3/27/2019   Little interest or pleasure in doing things Not at all   Feeling down, depressed, irritable, or hopeless Not at all   Total Score PHQ 2 0     Learning Assessment 3/27/2019   PRIMARY LEARNER Patient   BARRIERS PRIMARY LEARNER NONE   CO-LEARNER CAREGIVER No   PRIMARY LANGUAGE ENGLISH   LEARNER PREFERENCE PRIMARY LISTENING   ANSWERED BY patient   RELATIONSHIP SELF     Abuse Screening Questionnaire 3/27/2019   Do you ever feel afraid of your partner? N   Are you in a relationship with someone who physically or mentally threatens you? N   Is it safe for you to go home?  Davida Dance

## 2019-03-27 NOTE — PROGRESS NOTES
70 Antonieta Jorgensen MD, [address], [place], [place]       ZUHAIR Ann PA-C Alveda Spikes, [facility]-BC   ZUHAIR Ruiz NP [address] [facility] [address]    at [address], [facility] Chad Malagon  22.    [phone]    FAX: [address] [place] [address], [address]    [phone]    FAX: [phone]     Patient Care Team:  Lisa Colby MD as PCP - General (Obstetrics & Gynecology)    Problem List  Date Reviewed: 9/27/2018          Codes Class Noted    Chronic hepatitis C without hepatic coma [facility]) ICD-10-CM: B18.2  ICD-9-CM: 070.54  3/16/2018            Claudeen Hung \"Anel\" [facility] for management of chronic HCV. The active problem list, all pertinent past medical history, medications, liver histology, radiologic findings and laboratory findings related to the liver disorder were reviewed with the patient. The patient is a 33 y.o.  female who was noted to have abnormalities in liver chemistries tested positive for chronic HCV in 6/2016. Imaging of the liver with ultrasound shows a normal appearing liver. An assessment of liver fibrosis with elastography shows no fibrosis. The patient has completed 8 weeks of therapy with Mavyret and she has achieved SVR. She is now 9 months status post end of treatment. The patient has no symptoms which can be attributed to the liver disorder. The patient has not experienced fatigue or pain in the right side over the liver. The patient completes all daily activities without any functional limitations. Her son Dolores Bass is now 17 months old.      ALLERGIES  No Known Allergies    MEDICATIONS  Current Outpatient Medications   Medication Sig    etonogestrel (NEXPLANON) 68 mg impl by SubDERmal route.  PNV No12-Iron-FA-DSS-OM-3 29 mg iron-1 mg -50 mg CPKD Take  by mouth. No current facility-administered medications for this visit. SYSTEM REVIEW NOT RELATED TO LIVER DISEASE OR REVIEWED ABOVE:  Constitution systems: Negative for fever, chills, weight gain, weight loss. Eyes: Negative for visual changes. ENT: Negative for sore throat, painful swallowing. Respiratory: Negative for cough, hemoptysis, SOB. Cardiology: Negative for chest pain, palpitations. GI:  Negative for constipation or diarrhea. : Negative for urinary frequency, dysuria, hematuria, nocturia. Skin: Negative for rash. Hematology: Negative for easy bruising, blood clots. Musculo-skeletal: Negative for back pain, muscle pain, weakness. Neurologic: Negative for headaches, dizziness, vertigo, memory problems not related to HE. Psychology: Negative for anxiety, depression. FAMILY HISTORY:  The father is alive and healthy. The mother is alive and healthy. There is no family history of liver disease. SOCIAL HISTORY:  The patient has never been . The patient has 1 child. The patient currently smokes 2 cigarettes daily. 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 The patient has never consumed significant amounts of alcohol. The patient currently is a part time student in drug addiction. PHYSICAL EXAMINATION:  Visit Vitals  /60 (BP 1 Location: Left arm, BP Patient Position: Sitting)   Pulse 74   Temp 97.8 °F (36.6 °C) (Tympanic)   Ht 5' 2\" (1.575 m)   Wt 140 lb (63.5 kg)   BMI 25.61 kg/m²     General: No acute distress. Eyes: Sclera anicteric. ENT: No oral lesions. Nodes: No adenopathy. Skin: No spider angiomata. No jaundice. No palmar erythema. Respiratory: Lungs clear to auscultation. Cardiovascular: Regular heart rate. No murmurs.  No JVD.  Abdomen: Soft non-tender. Liver size normal to percussion/palpation. Spleen not palpable. No obvious ascites. Extremities: No edema. No muscle wasting. No gross arthritic changes. Neurologic: Alert and oriented. Cranial nerves grossly intact. No asterixis. LABORATORY STUDIES:  [address] Units 3/27/2019 9/27/2018   WBC 3.4 - 10.8 x10E3/uL 7.3 5.2   ANC 1.4 - 7.0 x10E3/uL     HGB 11.1 - 15.9 g/dL 13.9 13.0    - 379 x10E3/uL 269 251   AST 0 - 40 IU/L 16 14   ALT 0 - 32 IU/L 12 10   Alk Phos 39 - 117 IU/L 60 57   Bili, Total 0.0 - 1.2 mg/dL 0.5 0.4   Bili, Direct 0.00 - 0.40 mg/dL 0.13 0.11   Albumin 3.5 - 5.5 g/dL 4.6 4.3   BUN 6 - 20 mg/dL 9 4 (L)   Creat 0.57 - 1.00 mg/dL 0.63 0.60   Na 134 - 144 mmol/L 139 139   K 3.5 - 5.2 mmol/L 4.3 3.8   Cl 96 - 106 mmol/L 102 103   CO2 20 - 29 mmol/L 23 24   Glucose 65 - 99 mg/dL 111 (H) 93     Liver [facility] Latest Ref Rng & Units 5/31/2018   WBC 3.4 - 10.8 x10E3/uL 8.1   ANC 1.4 - 7.0 x10E3/uL    HGB 11.1 - 15.9 g/dL 14.6    - 379 x10E3/uL 262   AST 0 - 40 IU/L 19   ALT 0 - 32 IU/L 15   Alk Phos 39 - 117 IU/L 92   Bili, Total 0.0 - 1.2 mg/dL 0.6   Bili, Direct 0.00 - 0.40 mg/dL 0.18   Albumin 3.5 - 5.5 g/dL 4.8   BUN 6 - 20 mg/dL 8   Creat 0.57 - 1.00 mg/dL 0.70   Na 134 - 144 mmol/L 143   K 3.5 - 5.2 mmol/L 4.3   Cl 96 - 106 mmol/L 102   CO2 20 - 29 mmol/L 25   Glucose 65 - 99 mg/dL 93     SEROLOGIES:  Serologies Latest Ref Rng & Units 3/16/2018 12/4/2017   Hep A Ab, Total Negative Positive (A)    Hep B Surface Ag Negative Negative    Hep B Core Ab, Total Negative Negative    Hep B Surface AB QL  Reactive    HCV RT-PCR, Quant IU/mL  337384   HCV Log10 log10 IU/mL  5.855     Virology Latest Ref Rng & Units 9/27/2018 5/31/2018 3/16/2018   HCV RNA, JOVANNA, QL Negative Negative Negative Positive (A)     LIVER HISTOLOGY:  4/2018. FibroScan performed at [address]. EkPa was 2.8. IQR/med 18%. The results suggested a fibrosis level of F0. CAP is 238, inconsistent with fatty liver disease. ENDOSCOPIC PROCEDURES:  Not available or performed    RADIOLOGY:  4/2018. Normal appearing liver. No masses or lesions. OTHER TESTING:  Not available or performed    ASSESSMENT AND PLAN:  Chronic HCV with no fibrosis. Liver function is normal. The platelet count is normal.      The patient has completed 8 weeks of therapy with Mavyret and has achieved SVR. She is now 9 months s/p end of treatment. The patient was directed to continue all current medications at the current dosages. There are no contraindications for the patient to take any medications that are necessary for treatment of other medical issues. The patient was counseled regarding alcohol consumption. Discussed the risk of vertical transmission which is very low. Recommend the baby get tested at the age of 2 years. This is the length of time the baby can spontaneously clear the virus. This will ensure no false positives. Vaccination for viral hepatitis A and B is not needed. The patient has serologic evidence of prior exposure or vaccination with immunity. All of the above issues were discussed with the patient. All questions were answered. The patient expressed a clear understanding of the above. 1901 [facility] 87 in 3 months, which will be 1 year after end of treatment. Because she has no fibrosis, this will likely be her last visit.       Yessica Reddy, [facility]  Liver [place] of [facility] [address] [facility] [address] [place], [address] Chad Malagon  22.  [phone]

## 2019-03-28 PROBLEM — B18.2 CHRONIC HEPATITIS C WITHOUT HEPATIC COMA (HCC): Status: RESOLVED | Noted: 2018-03-16 | Resolved: 2019-03-28

## 2019-03-28 PROBLEM — Z86.19 HEPATITIS C VIRUS INFECTION CURED AFTER ANTIVIRAL DRUG THERAPY: Status: ACTIVE | Noted: 2019-03-28

## 2019-03-28 LAB
ALBUMIN SERPL-MCNC: 4.6 G/DL (ref 3.5–5.5)
ALP SERPL-CCNC: 60 IU/L (ref 39–117)
ALT SERPL-CCNC: 12 IU/L (ref 0–32)
AST SERPL-CCNC: 16 IU/L (ref 0–40)
BILIRUB DIRECT SERPL-MCNC: 0.13 MG/DL (ref 0–0.4)
BILIRUB SERPL-MCNC: 0.5 MG/DL (ref 0–1.2)
BUN SERPL-MCNC: 9 MG/DL (ref 6–20)
BUN/CREAT SERPL: 14 (ref 9–23)
CALCIUM SERPL-MCNC: 9.3 MG/DL (ref 8.7–10.2)
CHLORIDE SERPL-SCNC: 102 MMOL/L (ref 96–106)
CO2 SERPL-SCNC: 23 MMOL/L (ref 20–29)
CREAT SERPL-MCNC: 0.63 MG/DL (ref 0.57–1)
ERYTHROCYTE [DISTWIDTH] IN BLOOD BY AUTOMATED COUNT: 13.1 % (ref 12.3–15.4)
GLUCOSE SERPL-MCNC: 111 MG/DL (ref 65–99)
HCT VFR BLD AUTO: 42.3 % (ref 34–46.6)
HGB BLD-MCNC: 13.9 G/DL (ref 11.1–15.9)
MCH RBC QN AUTO: 31.9 PG (ref 26.6–33)
MCHC RBC AUTO-ENTMCNC: 32.9 G/DL (ref 31.5–35.7)
MCV RBC AUTO: 97 FL (ref 79–97)
PLATELET # BLD AUTO: 269 X10E3/UL (ref 150–379)
POTASSIUM SERPL-SCNC: 4.3 MMOL/L (ref 3.5–5.2)
PROT SERPL-MCNC: 7.1 G/DL (ref 6–8.5)
RBC # BLD AUTO: 4.36 X10E6/UL (ref 3.77–5.28)
SODIUM SERPL-SCNC: 139 MMOL/L (ref 134–144)
WBC # BLD AUTO: 7.3 X10E3/UL (ref 3.4–10.8)

## 2019-03-29 LAB — HCV RNA SERPL QL NAA+PROBE: NEGATIVE

## 2019-06-25 ENCOUNTER — OFFICE VISIT (OUTPATIENT)
Dept: HEMATOLOGY | Age: 29
End: 2019-06-25

## 2019-06-25 VITALS
HEART RATE: 85 BPM | WEIGHT: 141 LBS | SYSTOLIC BLOOD PRESSURE: 103 MMHG | BODY MASS INDEX: 25.79 KG/M2 | OXYGEN SATURATION: 96 % | TEMPERATURE: 98 F | DIASTOLIC BLOOD PRESSURE: 61 MMHG

## 2019-06-25 DIAGNOSIS — B18.2 CHRONIC HEPATITIS C WITHOUT HEPATIC COMA (HCC): Primary | ICD-10-CM

## 2019-06-25 NOTE — PROGRESS NOTES
70 Antonieta Jorgensen MD, [address], [place], [place]       ZUHAIR Martinez PA-C Martyn Kapur, [facility]   ZUHAIR Cueto NP [address] [facility] [address]    at [address], [address] Chad Malagon  22.    [phone]    FAX: [phone] [facility]    at [address], [address] [facility], [address]    [phone]    FAX: [phone]     Patient Care Team:  Destinee Machado MD as PCP - General (Obstetrics & Gynecology)    Problem List  Date Reviewed: 3/28/2019          Codes Class Noted    Hepatitis C virus infection cured after antiviral drug therapy ICD-10-CM: Z86.19  ICD-9-CM: V12.09  3/28/2019    Overview Signed 3/28/2019  3:59 PM by Leopold Floro., NP     8 weeks of Maria De Jesus Peek. SVR achieved 9/27/2018. Pepper Finders \"Anel\" returns to the [place] [address] for management of chronic HCV, she has been shown to have achieved a sustained response to therapy. The active problem list, all pertinent past medical history, medications, liver histology, radiologic findings and laboratory findings related to the liver disorder were reviewed with the patient. The patient is a 33 y.o.  female who was noted to have abnormalities in liver chemistries tested positive for chronic HCV in 6/2016. Imaging of the liver with ultrasound shows a normal appearing liver in 4/2018. An assessment of liver fibrosis with elastography shows no fibrosis prior to the start of treatment. The patient has completed 8 weeks of therapy in ~6/2018 with [facility] De [facility] Peek and she has achieved SVR. She is now one year status post end of treatment and has been shown at past office visit to have had a sustained response.   This will be confirmed today. The patient has no symptoms which can be attributed to the liver disorder. The patient has not experienced fatigue or pain in the right side over the liver. The patient completes all daily activities without any functional limitations. Her son Ramiro Ritter is now 21 months old. ALLERGIES  No Known Allergies    MEDICATIONS  Current Outpatient Medications   Medication Sig    etonogestrel (NEXPLANON) 68 mg impl by SubDERmal route.  PNV No12-Iron-FA-DSS-OM-3 29 mg iron-1 mg -50 mg CPKD Take  by mouth. No current facility-administered medications for this visit. SYSTEM REVIEW NOT RELATED TO LIVER DISEASE OR REVIEWED ABOVE:  Constitution systems: Negative for fever, chills, weight gain, weight loss. Eyes: Negative for visual changes. ENT: Negative for sore throat, painful swallowing. Respiratory: Negative for cough, hemoptysis, SOB. Cardiology: Negative for chest pain, palpitations. GI:  Negative for constipation or diarrhea. : Negative for urinary frequency, dysuria, hematuria, nocturia. Skin: Negative for rash. Hematology: Negative for easy bruising, blood clots. Musculo-skeletal: Negative for back pain, muscle pain, weakness. Neurologic: Negative for headaches, dizziness, vertigo, memory problems not related to HE. Psychology: Negative for anxiety, depression. FAMILY HISTORY:  The father is alive and healthy. The mother is alive and healthy. There is no family history of liver disease. SOCIAL HISTORY:  The patient has never been . The patient has 1 child. The patient currently smokes 2 cigarettes daily. 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 The patient has never consumed significant amounts of alcohol. The patient currently is a part time student in drug addiction.       PHYSICAL EXAMINATION:  Visit Vitals  /61 (BP 1 Location: Left arm, BP Patient Position: Sitting)   Pulse 85   Temp 98 °F (36.7 °C) (Tympanic) Wt 141 lb (64 kg)   SpO2 96%   BMI 25.79 kg/m²     General: No acute distress. Eyes: Sclera anicteric. ENT: No oral lesions. Nodes: No adenopathy. Skin: No spider angiomata. No jaundice. No palmar erythema. Respiratory: Lungs clear to auscultation. Cardiovascular: Regular heart rate. No murmurs. No JVD. Abdomen: Soft non-tender. Liver size normal to percussion/palpation. Spleen not palpable. No obvious ascites. Extremities: No edema. No muscle wasting. No gross arthritic changes. Neurologic: Alert and oriented. Cranial nerves grossly intact. No asterixis.     LABORATORY STUDIES:  [facility] [place] [facility] [id number]  [address] Units 3/27/2019 9/27/2018   WBC 3.4 - 10.8 x10E3/uL 7.3 5.2   ANC 1.4 - 7.0 x10E3/uL     HGB 11.1 - 15.9 g/dL 13.9 13.0    - 379 x10E3/uL 269 251   AST 0 - 40 IU/L 16 14   ALT 0 - 32 IU/L 12 10   Alk Phos 39 - 117 IU/L 60 57   Bili, Total 0.0 - 1.2 mg/dL 0.5 0.4   Bili, Direct 0.00 - 0.40 mg/dL 0.13 0.11   Albumin 3.5 - 5.5 g/dL 4.6 4.3   BUN 6 - 20 mg/dL 9 4 (L)   Creat 0.57 - 1.00 mg/dL 0.63 0.60   Na 134 - 144 mmol/L 139 139   K 3.5 - 5.2 mmol/L 4.3 3.8   Cl 96 - 106 mmol/L 102 103   CO2 20 - 29 mmol/L 23 24   Glucose 65 - 99 mg/dL 111 (H) 93     [facility] [place] [facility] [address] [place] [address] Ref Rng & Units 5/31/2018   WBC 3.4 - 10.8 x10E3/uL 8.1   ANC 1.4 - 7.0 x10E3/uL    HGB 11.1 - 15.9 g/dL 14.6    - 379 x10E3/uL 262   AST 0 - 40 IU/L 19   ALT 0 - 32 IU/L 15   Alk Phos 39 - 117 IU/L 92   Bili, Total 0.0 - 1.2 mg/dL 0.6   Bili, Direct 0.00 - 0.40 mg/dL 0.18   Albumin 3.5 - 5.5 g/dL 4.8   BUN 6 - 20 mg/dL 8   Creat 0.57 - 1.00 mg/dL 0.70   Na 134 - 144 mmol/L 143   K 3.5 - 5.2 mmol/L 4.3   Cl 96 - 106 mmol/L 102   CO2 20 - 29 mmol/L 25   Glucose 65 - 99 mg/dL 93     Virology Latest Ref Rng & Units 3/27/2019 9/27/2018 5/31/2018   HCV RNA, JOVANNA, QL Negative Negative Negative Negative   Additional lab values drawn at today's office visit are pending at the time of documentation. SEROLOGIES:  Serologies Latest Ref Rng & Units 3/16/2018 12/4/2017   Hep A Ab, Total Negative Positive (A)    Hep B Surface Ag Negative Negative    Hep B Core Ab, Total Negative Negative    Hep B Surface AB QL  Reactive    HCV RT-PCR, Quant IU/mL  [id number]   HCV Log10 log10 IU/mL  5.855     LIVER HISTOLOGY:  4/2018. FibroScan performed at [address]. EkPa was 2.8. IQR/med 18%. The results suggested a fibrosis level of F0. CAP is 238. ENDOSCOPIC PROCEDURES:  Not available or performed    RADIOLOGY:  4/2018. Normal appearing liver. No masses or lesions. OTHER TESTING:  Not available or performed    ASSESSMENT AND PLAN:  Chronic HCV with no fibrosis, SVR. Liver function is normal. The platelet count is normal.      The patient has completed 8 weeks of therapy in 6/2018 with [address] and has achieved SVR. She is now 12 months s/p end of treatment and if her virus remains undetected, there is no long-term indication for follow-up. I have reviewed with her that she will continue to test positive for HCV Ab, but that this is not a protective antibody and that she could contract HCV again if exposed. We also discussed the importance of maintaining healthy weight and avoid fatty liver or alcohol injury in the future. I have recommended that she continue follow-up with her PCP for routine monitoring and if she has future elevation of liver enzymes, we would be happy to see her back in follow-up. She otherwise will be discharged from the practice at this point. She is in agreement with this plan. The patient was directed to continue all current medications at the current dosages. There are no contraindications for the patient to take any medications that are necessary for treatment of other medical issues. The patient was counseled regarding alcohol consumption. Discussed the risk of vertical transmission which is very low.   Recommend the baby get tested at the age of 2 years as passive Ab from the mother would be cleared by that point. Vaccination for viral hepatitis A and B is not needed. The patient has serologic evidence of prior exposure or vaccination with immunity. All of the above issues were discussed with the patient. All questions were answered. The patient expressed a clear understanding of the above. Follow-up Demian Ly 32 prn only.     Radha Gomez PA-C  [facility] [place] [address], [address] [place] Malagon  22.  [phone]

## 2019-06-25 NOTE — PROGRESS NOTES
1. Have you been to the ER, urgent care clinic since your last visit? Hospitalized since your last visit? No    2. Have you seen or consulted any other health care providers outside of the [address] [place] [address] since your last visit? Include any pap smears or colon screening. No   Chief Complaint   Patient presents with    Follow-up     3 month follow up     Visit Vitals  /61 (BP 1 Location: Left arm, BP Patient Position: Sitting)   Pulse 85   Temp 98 °F (36.7 °C) (Tympanic)   Wt 141 lb (64 kg)   SpO2 96%   BMI 25.79 kg/m²     3 most recent PHQ Screens 6/25/2019   Little interest or pleasure in doing things Not at all   Feeling down, depressed, irritable, or hopeless Not at all   Total Score PHQ 2 0     Learning Assessment 6/25/2019   PRIMARY LEARNER Patient   BARRIERS PRIMARY LEARNER NONE   CO-LEARNER CAREGIVER No   PRIMARY LANGUAGE ENGLISH   LEARNER PREFERENCE PRIMARY LISTENING   ANSWERED BY patient    RELATIONSHIP SELF     Abuse Screening Questionnaire 6/25/2019   Do you ever feel afraid of your partner? N   Are you in a relationship with someone who physically or mentally threatens you? N   Is it safe for you to go home? Y     No flowsheet data found.

## 2019-06-26 LAB
ALBUMIN SERPL-MCNC: 4.8 G/DL (ref 3.5–5.5)
ALP SERPL-CCNC: 61 IU/L (ref 39–117)
ALT SERPL-CCNC: 9 IU/L (ref 0–32)
AST SERPL-CCNC: 28 IU/L (ref 0–40)
BILIRUB DIRECT SERPL-MCNC: 0.08 MG/DL (ref 0–0.4)
BILIRUB SERPL-MCNC: 0.4 MG/DL (ref 0–1.2)

## 2019-06-27 LAB — HCV RNA SERPL QL NAA+PROBE: NEGATIVE

## 2019-07-01 NOTE — PROGRESS NOTES
Pt notified of stable liver enzymes and function and continued negative HCV RNA, this indicates she has had a sustained viral response, no indication for additional long-term follow-up in this office.

## 2019-08-07 ENCOUNTER — OFFICE VISIT (OUTPATIENT)
Dept: OBGYN CLINIC | Age: 29
End: 2019-08-07

## 2019-08-07 VITALS — BODY MASS INDEX: 26.34 KG/M2 | SYSTOLIC BLOOD PRESSURE: 104 MMHG | DIASTOLIC BLOOD PRESSURE: 59 MMHG | WEIGHT: 144 LBS

## 2019-08-07 DIAGNOSIS — N89.8 VAGINAL DISCHARGE: Primary | ICD-10-CM

## 2019-08-07 NOTE — PROGRESS NOTES
Chief Complaint   Vaginal Discharge      HPI  33 y.o. female complains of white and malodorous vaginal discharge for 1 month. .No LMP recorded. Patient has had an implant. She admits to additional symptoms at this time. Itching slightly  She denies exposure to new chemicals to hygenic agents  Previous treatment included:  None  No new partners. Notices it especially after IC. Past Medical History:   Diagnosis Date    Constipation     GERD (gastroesophageal reflux disease)     Hepatitis C     Intravenous drug abuse complicating pregnancy ([facility] [address]     Heroin and Meth    Nexplanon in place 01/17/2018    Nexplanon placed     No past surgical history on file. Social History     Occupational History    Not on file   Tobacco Use    Smoking status: Former Smoker    Smokeless tobacco: Never Used   Substance and Sexual Activity    Alcohol use: No    Drug use: No     Types: Methamphetamines, Heroin    Sexual activity: Not Currently     Partners: Male     Birth control/protection: None     Family History   Problem Relation Age of Onset    Colon Cancer Father         No Known Allergies  Prior to Admission medications    Medication Sig Start Date End Date Taking? Authorizing Provider   etonogestrel (NEXPLANON) 68 mg impl by SubDERmal route. Provider, Historical   PNV No12-Iron-FA-DSS-OM-3 29 mg iron-1 mg -50 mg CPKD Take  by mouth.     Provider, Historical                      Review of Systems - History obtained from the patient  Constitutional: negative for weight loss, fever, night sweats  Breast: negative for breast lumps, nipple discharge, galactorrhea  GI: negative for change in bowel habits, abdominal pain, black or bloody stools  : negative for frequency, dysuria, hematuria  MSK: negative for back pain, joint pain, muscle pain  Skin: negative for itching, rash, hives  Neuro: negative for dizziness, headache, confusion, weakness  Psych: negative for anxiety, depression, change in mood  Heme/lymph: negative for bleeding, bruising, pallor       Objective:    Visit Vitals  /59   Wt 144 lb (65.3 kg)   BMI 26.34 kg/m²       Physical Exam:   PHYSICAL EXAMINATION    Constitutional  · Appearance: well-nourished, well developed, alert, in no acute distress    HENT  · Head and Face: appears normal    Genitourinary  · External Genitalia: normal appearance for age, no discharge present, no tenderness present, no inflammatory lesions present, no masses present, no atrophy present  · Vagina:  min discharge present, otherwise normal vaginal vault without central or paravaginal defects, no inflammatory lesions present, no masses present  · Bladder: non-tender to palpation  · Urethra: appears normal  · Cervix: normal   · Uterus: normal size, shape and consistency  · Adnexa: no adnexal tenderness present, no adnexal masses present  · Perineum: perineum within normal limits, no evidence of trauma, no rashes or skin lesions present  · Anus: anus within normal limits, no hemorrhoids present  · Inguinal Lymph Nodes: no lymphadenopathy present    Skin  · General Inspection: no rash, no lesions identified    Neurologic/Psychiatric  · Mental Status:  · Orientation: grossly oriented to person, place and time  · Mood and Affect: mood normal, affect appropriate      Assessment:   Vaginal odor    Plan:   Nuswab sent  Await results  Needs AE  ROV prn if symptoms persist or worsen. Statement Selected

## 2019-08-10 LAB
A VAGINAE DNA VAG QL NAA+PROBE: ABNORMAL SCORE
BVAB2 DNA VAG QL NAA+PROBE: ABNORMAL SCORE
C ALBICANS DNA VAG QL NAA+PROBE: NEGATIVE
C GLABRATA DNA VAG QL NAA+PROBE: NEGATIVE
MEGA1 DNA VAG QL NAA+PROBE: ABNORMAL SCORE

## 2019-08-12 RX ORDER — METRONIDAZOLE 500 MG/1
500 TABLET ORAL 2 TIMES DAILY
Qty: 14 TAB | Refills: 0 | Status: SHIPPED | OUTPATIENT
Start: 2019-08-12 | End: 2019-08-19

## 2019-08-14 ENCOUNTER — OFFICE VISIT (OUTPATIENT)
Dept: OBGYN CLINIC | Age: 29
End: 2019-08-14

## 2019-08-14 VITALS
BODY MASS INDEX: 28.52 KG/M2 | HEIGHT: 62 IN | DIASTOLIC BLOOD PRESSURE: 54 MMHG | SYSTOLIC BLOOD PRESSURE: 103 MMHG | WEIGHT: 155 LBS

## 2019-08-14 DIAGNOSIS — N92.6 IRREGULAR MENSES: ICD-10-CM

## 2019-08-14 DIAGNOSIS — R63.5 WEIGHT GAIN: ICD-10-CM

## 2019-08-14 DIAGNOSIS — Z01.419 ENCOUNTER FOR GYNECOLOGICAL EXAMINATION (GENERAL) (ROUTINE) WITHOUT ABNORMAL FINDINGS: Primary | ICD-10-CM

## 2019-08-14 NOTE — PATIENT INSTRUCTIONS
Well Visit, Ages 25 to 48: Care Instructions  Your Care Instructions    Physical exams can help you stay healthy. Your doctor has checked your overall health and may have suggested ways to take good care of yourself. He or she also may have recommended tests. At home, you can help prevent illness with healthy eating, regular exercise, and other steps. Follow-up care is a key part of your treatment and safety. Be sure to make and go to all appointments, and call your doctor if you are having problems. It's also a good idea to know your test results and keep a list of the medicines you take. How can you care for yourself at home? · Reach and stay at a healthy weight. This will lower your risk for many problems, such as obesity, diabetes, heart disease, and high blood pressure. · Get at least 30 minutes of physical activity on most days of the week. Walking is a good choice. You also may want to do other activities, such as running, swimming, cycling, or playing tennis or team sports. Discuss any changes in your exercise program with your doctor. · Do not smoke or allow others to smoke around you. If you need help quitting, talk to your doctor about stop-smoking programs and medicines. These can increase your chances of quitting for good. · Talk to your doctor about whether you have any risk factors for sexually transmitted infections (STIs). Having one sex partner (who does not have STIs and does not have sex with anyone else) is a good way to avoid these infections. · Use birth control if you do not want to have children at this time. Talk with your doctor about the choices available and what might be best for you. · Protect your skin from too much sun. When you're outdoors from 10 a.m. to 4 p.m., stay in the shade or cover up with clothing and a hat with a wide brim. Wear sunglasses that block UV rays. Even when it's cloudy, put broad-spectrum sunscreen (SPF 30 or higher) on any exposed skin.   · See a dentist one or two times a year for checkups and to have your teeth cleaned. · Wear a seat belt in the car. Follow your doctor's advice about when to have certain tests. These tests can spot problems early. For everyone  · Cholesterol. Have the fat (cholesterol) in your blood tested after age 21. Your doctor will tell you how often to have this done based on your age, family history, or other things that can increase your risk for heart disease. · Blood pressure. Have your blood pressure checked during a routine doctor visit. Your doctor will tell you how often to check your blood pressure based on your age, your blood pressure results, and other factors. · Vision. Talk with your doctor about how often to have a glaucoma test.  · Diabetes. Ask your doctor whether you should have tests for diabetes. · Colon cancer. Your risk for colorectal cancer gets higher as you get older. Some experts say that adults should start regular screening at age 48 and stop at age 76. Others say to start before age 48 or continue after age 76. Talk with your doctor about your risk and when to start and stop screening. For women  · Breast exam and mammogram. Talk to your doctor about when you should have a clinical breast exam and a mammogram. Medical experts differ on whether and how often women under 50 should have these tests. Your doctor can help you decide what is right for you. · Cervical cancer screening test and pelvic exam. Begin with a Pap test at age 24. The test often is part of a pelvic exam. Starting at age 27, you may choose to have a Pap test, an HPV test, or both tests at the same time (called co-testing). Talk with your doctor about how often to have testing. · Tests for sexually transmitted infections (STIs). Ask whether you should have tests for STIs. You may be at risk if you have sex with more than one person, especially if your partners do not wear condoms.   For men  · Tests for sexually transmitted infections (STIs). Ask whether you should have tests for STIs. You may be at risk if you have sex with more than one person, especially if you do not wear a condom. · Testicular cancer exam. Ask your doctor whether you should check your testicles regularly. · Prostate exam. Talk to your doctor about whether you should have a blood test (called a PSA test) for prostate cancer. Experts differ on whether and when men should have this test. Some experts suggest it if you are older than 39 and are -American or have a father or brother who got prostate cancer when he was younger than 72. When should you call for help? Watch closely for changes in your health, and be sure to contact your doctor if you have any problems or symptoms that concern you. Where can you learn more? Go to http://martin-andres.info/. Enter P072 in the search box to learn more about \"Well Visit, Ages 25 to 48: Care Instructions. \"  Current as of: December 13, 2018  Content Version: 12.1  © 8873-2499 [employer]. Care instructions adapted under license by [employer] (which disclaims liability or warranty for this information). If you have questions about a medical condition or this instruction, always ask your healthcare professional. [place] [address] any warranty or liability for your use of this information.

## 2019-08-14 NOTE — PROGRESS NOTES
Stephanie Aparicio is a ,  33 y.o. female [facility] whose No LMP recorded. Patient has had an implant. was on  who presents for her annual checkup. She is having no significant problems. Concerned about weight gain. With regard to the Gardisil vaccine, she is older than the FDA approved age to receive it. Menstrual status:    Her periods are light, moderate in flow. She is using three to five pads or tampons per day, usually regular, currently has nexplaon. . She is bleeding erratically    She denies dysmenorrhea. She reports no premenstrual symptoms. Contraception:    The current method of family planning is nexplanon and She declines contraception and counseling. Sexual history:    She  reports that she does not currently engage in sexual activity but has had partner(s) who are Male. She reports using the following method of birth control/protection: Implant. Medical conditions:    Since her last annual GYN exam about 2018 ago, she has not the following changes in her health history: none. Pap and Mammogram History:    Her most recent Pap smear wasnormal obtained 2018 year(s) ago. The patient has never had a mammogram.    The patient does not have a family history of breast cancer. Past Medical History:   Diagnosis Date    Constipation     GERD (gastroesophageal reflux disease)     Hepatitis C     Intravenous drug abuse complicating pregnancy ([facility] [address]     Heroin and Meth    Nexplanon in place 2018    Nexplanon placed     History reviewed. No pertinent surgical history. Current Outpatient Medications   Medication Sig Dispense Refill    metroNIDAZOLE (FLAGYL) 500 mg tablet Take 1 Tab by mouth two (2) times a day for 7 days. 14 Tab 0    etonogestrel (NEXPLANON) 68 mg impl by SubDERmal route. Allergies: Patient has no known allergies.    Social History     Socioeconomic History    Marital status:      Spouse name: Not on file    Number of children: Not on file    Years of education: Not on file    Highest education level: Not on file   Occupational History    Not on file   Social Needs    Financial resource strain: Not on file    Food insecurity:     Worry: Not on file     Inability: Not on file    Transportation needs:     Medical: Not on file     Non-medical: Not on file   Tobacco Use    Smoking status: Former Smoker    Smokeless tobacco: Never Used   Substance and Sexual Activity    Alcohol use: No    Drug use: No     Types: Methamphetamines, Heroin    Sexual activity: Not Currently     Partners: Male     Birth control/protection: Implant   Lifestyle    Physical activity:     Days per week: Not on file     Minutes per session: Not on file    Stress: Not on file   Relationships    Social connections:     Talks on phone: Not on file     Gets together: Not on file     Attends Catholic service: Not on file     Active member of club or organization: Not on file     Attends meetings of clubs or organizations: Not on file     Relationship status: Not on file    Intimate partner violence:     Fear of current or ex partner: Not on file     Emotionally abused: Not on file     Physically abused: Not on file     Forced sexual activity: Not on file   Other Topics Concern    Not on file   Social History Narrative    Not on file     Tobacco History:  reports that she has quit smoking. She has never used smokeless tobacco.  Alcohol Abuse:  reports that she does not drink alcohol. Drug Abuse:  reports that she does not use drugs.     Patient Active Problem List   Diagnosis Code    Hepatitis C virus infection cured after antiviral drug therapy Z86.19       Review of Systems - History obtained from the patient  Constitutional: negative for weight loss, fever, night sweats  HEENT: negative for hearing loss, earache, congestion, snoring, sorethroat  CV: negative for chest pain, palpitations, edema  Resp: negative for cough, shortness of breath, wheezing  GI: negative for change in bowel habits, abdominal pain, black or bloody stools  : negative for frequency, dysuria, hematuria, vaginal discharge  MSK: negative for back pain, joint pain, muscle pain  Breast: negative for breast lumps, nipple discharge, galactorrhea  Skin :negative for itching, rash, hives  Neuro: negative for dizziness, headache, confusion, weakness  Psych: negative for anxiety, depression, change in mood  Heme/lymph: negative for bleeding, bruising, pallor    Physical Exam    Visit Vitals  /54   Ht 5' 2\" (1.575 m)   Wt 155 lb (70.3 kg)   BMI 28.35 kg/m²       Constitutional  · Appearance: well-nourished, well developed, alert, in no acute distress    HENT  · Head and Face: appears normal    Neck  · Inspection/Palpation: normal appearance, no masses or tenderness  · Lymph Nodes: no lymphadenopathy present  · Thyroid: gland size normal, nontender, no nodules or masses present on palpation    Chest  · Respiratory Effort: breathing normal  · Auscultation: normal breath sounds    Cardiovascular  · Heart:  · Auscultation: regular rate and rhythm without murmur    Breasts  · Inspection of Breasts: breasts symmetrical, no skin changes, no discharge present, nipple appearance normal, no skin retraction present  · Palpation of Breasts and Axillae: no masses present on palpation, no breast tenderness  · Axillary Lymph Nodes: no lymphadenopathy present    Gastrointestinal  · Abdominal Examination: abdomen non-tender to palpation, normal bowel sounds, no masses present  · Liver and spleen: no hepatomegaly present, spleen not palpable  · Hernias: no hernias identified    Genitourinary  · External Genitalia: normal appearance for age, no discharge present, no tenderness present, no inflammatory lesions present, no masses present, no atrophy present  · Vagina: normal vaginal vault without central or paravaginal defects, no discharge present, no inflammatory lesions present, no masses present  · Bladder: non-tender to palpation  · Urethra: appears normal  · Cervix: normal   · Uterus: normal size, shape and consistency  · Adnexa: no adnexal tenderness present, no adnexal masses present  · Perineum: perineum within normal limits, no evidence of trauma, no rashes or skin lesions present  · Anus: anus within normal limits, no hemorrhoids present  · Inguinal Lymph Nodes: no lymphadenopathy present    Skin  · General Inspection: no rash, no lesions identified    Neurologic/Psychiatric  · Mental Status:  · Orientation: grossly oriented to person, place and time  · Mood and Affect: mood normal, affect appropriate    .   Assessment:  Routine gynecologic examination  Weight gain  Irregular bleeding    Plan:  Counseled re: diet, exercise, healthy lifestyle  Return for yearly wellness visits  Consider IUD   Check TSH

## 2019-08-15 LAB — TSH SERPL DL<=0.005 MIU/L-ACNC: 1.92 UIU/ML (ref 0.45–4.5)

## 2021-04-21 ENCOUNTER — TELEPHONE (OUTPATIENT)
Dept: OBGYN CLINIC | Age: 31
End: 2021-04-21

## 2021-04-21 NOTE — TELEPHONE ENCOUNTER
Call received at 520am    35year old patient last seen in the office on 8/14/2019 and had nexplanon on 1/17/2018      Patient calling today to reschedule her appointment today for ae and removal of nexplanon. Patient was rescheduled for 5/3/2021 at 1:20PM for ae and removal of nexplanon and discuss other options for birth control      Patient verbalized understanding.

## 2021-05-03 ENCOUNTER — OFFICE VISIT (OUTPATIENT)
Dept: OBGYN CLINIC | Age: 31
End: 2021-05-03
Payer: MEDICAID

## 2021-05-03 VITALS — WEIGHT: 128.6 LBS | SYSTOLIC BLOOD PRESSURE: 104 MMHG | BODY MASS INDEX: 23.52 KG/M2 | DIASTOLIC BLOOD PRESSURE: 65 MMHG

## 2021-05-03 DIAGNOSIS — Z01.419 ENCOUNTER FOR GYNECOLOGICAL EXAMINATION (GENERAL) (ROUTINE) WITHOUT ABNORMAL FINDINGS: Primary | ICD-10-CM

## 2021-05-03 DIAGNOSIS — Z30.46 ENCOUNTER FOR NEXPLANON REMOVAL: ICD-10-CM

## 2021-05-03 PROCEDURE — 11982 REMOVE DRUG IMPLANT DEVICE: CPT | Performed by: OBSTETRICS & GYNECOLOGY

## 2021-05-03 PROCEDURE — 99395 PREV VISIT EST AGE 18-39: CPT | Performed by: OBSTETRICS & GYNECOLOGY

## 2021-05-03 NOTE — PROGRESS NOTES
Daxa Ward is a ,  35 y.o. female WHITE whose No LMP recorded. Patient has had an implant. And has constant bleeding who presents for her annual checkup. She is having no significant problems. With regard to the Gardisil vaccine, she is older than the FDA approved age to receive it. Menstrual status:    Her periods are light, moderate in flow - she bleeds continuously    She denies dysmenorrhea. She reports no premenstrual symptoms. Contraception:    The current method of family planning is nexplanon and She declines contraception and counseling. Sexual history:    She  reports previously being sexually active and has had partner(s) who are Male. She reports using the following method of birth control/protection: Implant. Medical conditions:    Since her last annual GYN exam about two years ago, she has not the following changes in her health history: none. Pap and Mammogram History:    Her most recent Pap smear was 2018 neg  The patient has never had a mammogram.    The patient does not have a family history of breast cancer. Past Medical History:   Diagnosis Date    Constipation     GERD (gastroesophageal reflux disease)     Hepatitis C     Intravenous drug abuse complicating pregnancy ([facility] [address]     Heroin and Meth    Nexplanon in place 2018    Nexplanon placed     History reviewed. No pertinent surgical history. Current Outpatient Medications   Medication Sig Dispense Refill    etonogestrel (NEXPLANON) 68 mg impl by SubDERmal route. Allergies: Patient has no known allergies.    Social History     Socioeconomic History    Marital status:      Spouse name: Not on file    Number of children: Not on file    Years of education: Not on file    Highest education level: Not on file   Occupational History    Not on file   Social Needs    Financial resource strain: Not on file    Food insecurity     Worry: Not on file     Inability: Not on file  Transportation needs     Medical: Not on file     Non-medical: Not on file   Tobacco Use    Smoking status: Former Smoker    Smokeless tobacco: Never Used   Substance and Sexual Activity    Alcohol use: No    Drug use: No     Types: Methamphetamines, Heroin    Sexual activity: Not Currently     Partners: Male     Birth control/protection: Implant   Lifestyle    Physical activity     Days per week: Not on file     Minutes per session: Not on file    Stress: Not on file   Relationships    Social connections     Talks on phone: Not on file     Gets together: Not on file     Attends Protestant service: Not on file     Active member of club or organization: Not on file     Attends meetings of clubs or organizations: Not on file     Relationship status: Not on file    Intimate partner violence     Fear of current or ex partner: Not on file     Emotionally abused: Not on file     Physically abused: Not on file     Forced sexual activity: Not on file   Other Topics Concern    Not on file   Social History Narrative    Not on file     Tobacco History:  reports that she has quit smoking. She has never used smokeless tobacco.  Alcohol Abuse:  reports no history of alcohol use. Drug Abuse:  reports no history of drug use.     Patient Active Problem List   Diagnosis Code    Hepatitis C virus infection cured after antiviral drug therapy Z86.19       Review of Systems - History obtained from the patient  Constitutional: negative for weight loss, fever, night sweats  HEENT: negative for hearing loss, earache, congestion, snoring, sorethroat  CV: negative for chest pain, palpitations, edema  Resp: negative for cough, shortness of breath, wheezing  GI: negative for change in bowel habits, abdominal pain, black or bloody stools  : negative for frequency, dysuria, hematuria, vaginal discharge  MSK: negative for back pain, joint pain, muscle pain  Breast: negative for breast lumps, nipple discharge, galactorrhea  Skin Viona Stager negative for itching, rash, hives  Neuro: negative for dizziness, headache, confusion, weakness  Psych: negative for anxiety, depression, change in mood  Heme/lymph: negative for bleeding, bruising, pallor    Physical Exam    Visit Vitals  /65   Wt 128 lb 9.6 oz (58.3 kg)   BMI 23.52 kg/m²       Constitutional  · Appearance: well-nourished, well developed, alert, in no acute distress    HENT  · Head and Face: appears normal    Neck  · Inspection/Palpation: normal appearance, no masses or tenderness  · Lymph Nodes: no lymphadenopathy present  · Thyroid: gland size normal, nontender, no nodules or masses present on palpation    Chest  · Respiratory Effort: breathing normal  · Auscultation: normal breath sounds    Cardiovascular  · Heart:  · Auscultation: regular rate and rhythm without murmur    Breasts  · Inspection of Breasts: breasts symmetrical, no skin changes, no discharge present, nipple appearance normal, no skin retraction present  · Palpation of Breasts and Axillae: no masses present on palpation, no breast tenderness  · Axillary Lymph Nodes: no lymphadenopathy present    Gastrointestinal  · Abdominal Examination: abdomen non-tender to palpation, normal bowel sounds, no masses present  · Liver and spleen: no hepatomegaly present, spleen not palpable  · Hernias: no hernias identified    Genitourinary  · External Genitalia: normal appearance for age, no discharge present, no tenderness present, no inflammatory lesions present, no masses present, no atrophy present  · Vagina: normal vaginal vault without central or paravaginal defects, no discharge present, no inflammatory lesions present, no masses present  · Bladder: non-tender to palpation  · Urethra: appears normal  · Cervix: normal   · Uterus: normal size, shape and consistency  · Adnexa: no adnexal tenderness present, no adnexal masses present  · Perineum: perineum within normal limits, no evidence of trauma, no rashes or skin lesions present  · Anus: anus within normal limits, no hemorrhoids present  · Inguinal Lymph Nodes: no lymphadenopathy present    Skin  · General Inspection: no rash, no lesions identified    Neurologic/Psychiatric  · Mental Status:  · Orientation: grossly oriented to person, place and time  · Mood and Affect: mood normal, affect appropriate    . Assessment:  Routine gynecologic examination  Her current medical status is satisfactory with no evidence of significant gynecologic issues. Plan:  Counseled re: diet, exercise, healthy lifestyle  Return for yearly wellness visits  Pap  Remove nexplanon  Call with menses for IUD   [facility]  OFFICE PROCEDURE PROGRESS NOTE        Chart reviewed for the following:   Neil Lyman MD, have reviewed the History, Physical and updated the Allergic reactions for Veronica D [address] performed immediately prior to start of procedure:   Neil Lyman MD, have performed the following reviews on Rosales Jimenez prior to the start of the procedure:            * Patient was identified by name and date of birth   * Agreement on procedure being performed was verified  * Risks and Benefits explained to the patient  * Procedure site verified and marked as necessary  * Patient was positioned for comfort  * Consent was signed and verified     Time: 2:00      Date of procedure: 5/3/2021    Procedure performed by:  Jesse Green MD    How tolerated by patient: tolerated the procedure well with no complications    Post Procedural Pain Scale: 2 - Hurts Little Bit    Comments: none    Procedure note: Nexplanon/Implanon removal    Rosales Jimenez is a ,  35 y.o. female whose No LMP recorded. Patient has had an implant. .  She presents for office removal of a NEXPLANON/IMPLANON sub-dermal contraceptive implant. Procedure:  She was positioned so the site of her implant was visable and easily accessible. The implant was located by palpation.  The end of the implant nearest the elbow was marked with a sterile marker. The operative site was cleansed with Betadine. Using a 27 gauge needle on a 3cc syringe and 1% lidocaine, 5 cc were infiltrated as a intradermal wheal and underneath the end of the implant closest to the elbow. Downward pressure was applied on the end of the implant nearest the axilla and a 2-3mm incision was made in the longitudinal direction of the arm at the tip of the implant closest to the elbow. The implant was then pushed gently toward the incision until the tip was visible. The fibrous capsule was opened with a combination of blunt and sharp dissection. The implant was grasped with mosquito forceps and removed intact. It measured a full 4 cm in length. The skin was cleansed and dried. A steristrip was applied then topped with a folded 4x4 gauze and a Curlex pressure dressing. There were no complication or problems. She demonstrated full active range of movement of her elbow, wrist, all five digits and denied numbness and tingling. Post-procedure:  She was told to remove the dressing in 12-24 hours, to keep the incision area dry for 24 hours and to remove the Steristrip in 5-7 days. She was given our 24-hour phone number and encouraged to call if there are any problems. See progress note for family planning consultation.

## 2021-05-03 NOTE — PATIENT INSTRUCTIONS
Well Visit, Ages 25 to 48: Care Instructions  Overview     Well visits can help you stay healthy. Your doctor has checked your overall health and may have suggested ways to take good care of yourself. Your doctor also may have recommended tests. At home, you can help prevent illness with healthy eating, regular exercise, and other steps. Follow-up care is a key part of your treatment and safety. Be sure to make and go to all appointments, and call your doctor if you are having problems. It's also a good idea to know your test results and keep a list of the medicines you take. How can you care for yourself at home? · Get screening tests that you and your doctor decide on. Screening helps find diseases before any symptoms appear. · Eat healthy foods. Choose fruits, vegetables, whole grains, protein, and low-fat dairy foods. Limit fat, especially saturated fat. Reduce salt in your diet. · Limit alcohol. If you are a man, have no more than 2 drinks a day or 14 drinks a week. If you are a woman, have no more than 1 drink a day or 7 drinks a week. · Get at least 30 minutes of physical activity on most days of the week. Walking is a good choice. You also may want to do other activities, such as running, swimming, cycling, or playing tennis or team sports. Discuss any changes in your exercise program with your doctor. · Reach and stay at a healthy weight. This will lower your risk for many problems, such as obesity, diabetes, heart disease, and high blood pressure. · Do not smoke or allow others to smoke around you. If you need help quitting, talk to your doctor about stop-smoking programs and medicines. These can increase your chances of quitting for good. · Care for your mental health. It is easy to get weighed down by worry and stress. Learn strategies to manage stress, like deep breathing and mindfulness, and stay connected with your family and community.  If you find you often feel sad or hopeless, talk with your doctor. Treatment can help. · Talk to your doctor about whether you have any risk factors for sexually transmitted infections (STIs). You can help prevent STIs if you wait to have sex with a new partner (or partners) until you've each been tested for STIs. It also helps if you use condoms (male or female condoms) and if you limit your sex partners to one person who only has sex with you. Vaccines are available for some STIs, such as HPV. · Use birth control if it's important to you to prevent pregnancy. Talk with your doctor about the choices available and what might be best for you. · If you think you may have a problem with alcohol or drug use, talk to your doctor. This includes prescription medicines (such as amphetamines and opioids) and illegal drugs (such as cocaine and methamphetamine). Your doctor can help you figure out what type of treatment is best for you. · Protect your skin from too much sun. When you're outdoors from 10 a.m. to 4 p.m., stay in the shade or cover up with clothing and a hat with a wide brim. Wear sunglasses that block UV rays. Even when it's cloudy, put broad-spectrum sunscreen (SPF 30 or higher) on any exposed skin. · See a dentist one or two times a year for checkups and to have your teeth cleaned. · Wear a seat belt in the car. When should you call for help? Watch closely for changes in your health, and be sure to contact your doctor if you have any problems or symptoms that concern you. Where can you learn more? Go to http://www.[employer].com/  Enter P072 in the search box to learn more about \"Well Visit, Ages 25 to 48: Care Instructions. \"  Current as of: May 27, 2020               Content Version: 12.8  © 1698-9325 [employer]. Care instructions adapted under license by [employer] (which disclaims liability or warranty for this information).  If you have questions about a medical condition or this instruction, always ask your healthcare professional. [place] [address] any warranty or liability for your use of this information.

## 2021-05-04 LAB
CYTOLOGIST CVX/VAG CYTO: NORMAL
CYTOLOGY CVX/VAG DOC CYTO: NORMAL
CYTOLOGY CVX/VAG DOC THIN PREP: NORMAL
CYTOLOGY HISTORY:: NORMAL
DX ICD CODE: NORMAL
LABCORP, 190119: NORMAL
Lab: NORMAL
OTHER STN SPEC: NORMAL
STAT OF ADQ CVX/VAG CYTO-IMP: NORMAL

## 2021-06-07 NOTE — PROGRESS NOTES
[facility]  OFFICE PROCEDURE PROGRESS NOTE        Chart reviewed for the following:   Renetta Mccrary LPN, have reviewed the History, Physical and updated the Allergic reactions for Veronica GONZALEZ [address] performed immediately prior to start of procedure:   Renetta Mccrary LPN, have performed the following reviews on Ulises Agustin prior to the start of the procedure:            * Patient was identified by name and date of birth   * Agreement on procedure being performed was verified  * Risks and Benefits explained to the patient  * Consent was signed and verified     Time: 3698    Date of procedure: 2021    Procedure performed by: Latrice Lei MD    Provider assisted by: Barbi Vann. Threlkeld  LPN    Patient assisted by: self    How tolerated by patient: tolerated the procedure well with no complications    Post Procedural Pain Scale: 4 - Hurts Little More    Comments: none      MIRENA IUD INSERTION  Indications:  Ulises Agustin is a ,  35 y.o. female WHITE whose Patient's last menstrual period was 2021 (exact date). was on 6/3/2021 and was normal in duration and amount of flow. who presents for insertion of an IUD. The risks, benefits and alternatives of IUD insertion were discussed in detail at last visit. She also has reviewed Mirena information. She has elected to proceed with the insertion today and she states she has no further questions. A urine pregnancy test was negative     Procedure: The pelvic exam revealed normal external genitalia. On bimanual exam the uterus was anteverted and normal in size with no tenderness present. A speculum was inserted into the vagina and the cervix was visualized. The cervix was prepped with zephiran solution. The anterior lip of the cervix was grasped with a single toothed tenaculum. The uterus was sounded with a Darden sound to 8 centimeters. A Mirena was then inserted without difficulty.  The string was cut to 3 centimeters. She experienced a moderate  amount of cramping. Post Procedure Status:   She tolerated the procedure with moderate discomfort. The patient was observed for 20 minutes after the insertion. There were no complications. Patient was discharged in stable condition. The patient received Mirena lot number [id number]. She was advised to have an US in 6 weeks and to use condoms until then.

## 2021-06-08 ENCOUNTER — OFFICE VISIT (OUTPATIENT)
Dept: OBGYN CLINIC | Age: 31
End: 2021-06-08
Payer: MEDICAID

## 2021-06-08 VITALS
BODY MASS INDEX: 23.37 KG/M2 | SYSTOLIC BLOOD PRESSURE: 100 MMHG | DIASTOLIC BLOOD PRESSURE: 58 MMHG | WEIGHT: 127 LBS | HEIGHT: 62 IN

## 2021-06-08 DIAGNOSIS — Z30.430 ENCOUNTER FOR IUD INSERTION: Primary | ICD-10-CM

## 2021-06-08 LAB
HCG URINE, QL. (POC): NEGATIVE
VALID INTERNAL CONTROL?: YES

## 2021-06-08 PROCEDURE — 81025 URINE PREGNANCY TEST: CPT | Performed by: OBSTETRICS & GYNECOLOGY

## 2021-06-08 PROCEDURE — 58300 INSERT INTRAUTERINE DEVICE: CPT | Performed by: OBSTETRICS & GYNECOLOGY

## 2021-06-08 RX ORDER — LEVONORGESTREL 52 MG/1
1 INTRAUTERINE DEVICE INTRAUTERINE ONCE
Qty: 1 DEVICE | Refills: 0
Start: 2021-06-08 | End: 2021-06-08

## 2021-06-08 RX ORDER — GABAPENTIN 300 MG/1
CAPSULE ORAL
COMMUNITY
Start: 2021-05-30

## 2021-06-08 RX ORDER — ATOMOXETINE 18 MG/1
CAPSULE ORAL
COMMUNITY
Start: 2021-05-30

## 2021-07-20 ENCOUNTER — OFFICE VISIT (OUTPATIENT)
Dept: OBGYN CLINIC | Age: 31
End: 2021-07-20

## 2021-07-20 VITALS
SYSTOLIC BLOOD PRESSURE: 104 MMHG | DIASTOLIC BLOOD PRESSURE: 60 MMHG | HEIGHT: 62 IN | BODY MASS INDEX: 23.37 KG/M2 | WEIGHT: 127 LBS

## 2021-07-20 DIAGNOSIS — Z30.431 IUD CHECK UP: Primary | ICD-10-CM

## 2021-07-20 PROCEDURE — 99213 OFFICE O/P EST LOW 20 MIN: CPT | Performed by: OBSTETRICS & GYNECOLOGY

## 2021-07-20 RX ORDER — METRONIDAZOLE 500 MG/1
500 TABLET ORAL 2 TIMES DAILY
Qty: 14 TABLET | Refills: 0 | Status: SHIPPED | OUTPATIENT
Start: 2021-07-20 | End: 2021-07-27

## 2021-07-20 NOTE — PROGRESS NOTES
This is a follow-up visit for Josef Valles is a ,  35 y.o. female WHITE/NON- whose No LMP recorded. (Menstrual status: IUD). was on . She had an Mirena IUD placed six weeks ago. Since the IUD placement, the patient has not had any unusual complaints. She has had some mild non-menstrual bleeding. She describes having a spotting amount of blood-tinged discharge which has occurred off and on since insertion of the Mirena. She has had no significant pelvic pain. She has had no fever. Associated signs and symptoms: she denies dyspareunia, expulsion, heavy bleeding, increased pain, fever, and pelvic pain. Ultrasound was done today and revealed:    TRANSVAGINAL ULTRASOUND PERFORMED  UTERUS IS ANTEVERTED, NORMAL IN SIZE AND ECHOGENICITY. ENDOMETRIUM MEASURES 8-9MM IN THICKNESS. NO EVIDENCE OF MASSES OR ABNORMALITIES ARE SEEN. IUD IS SEEN IN THE PROPER POSITION WITHIN THE ENDOMETRIAL CAVITY IN THE UTERINE FUNDUS. RIGHT OVARY APPEARS WITHIN NORMAL LIMITS. LEFT OVARY APPEARS WITHIN NORMAL LIMITS. FREE FLUID SEEN IN THE CDS. Past Medical History:   Diagnosis Date    Constipation     Encounter for insertion of mirena IUD 2021    GERD (gastroesophageal reflux disease)     Hepatitis C     Intravenous drug abuse complicating pregnancy ([facility] [address]     Heroin and Meth     No past surgical history on file. Social History     Occupational History    Not on file   Tobacco Use    Smoking status: Former Smoker    Smokeless tobacco: Never Used   Substance and Sexual Activity    Alcohol use: No    Drug use: No     Types: Methamphetamines, Heroin    Sexual activity: Not Currently     Partners: Male     Birth control/protection: I.U.D. Family History   Problem Relation Age of Onset    Colon Cancer Father        No Known Allergies  Prior to Admission medications    Medication Sig Start Date End Date Taking?  Authorizing Provider   atomoxetine (STRATTERA) 18 mg capsule  21   Provider, Historical   gabapentin (NEURONTIN) 300 mg capsule  5/30/21   Provider, Historical        Review of Systems: History obtained from the patient  Constitutional: negative for weight loss, fever, night sweats  Breast: negative for breast lumps, nipple discharge, galactorrhea  GI: negative for change in bowel habits, abdominal pain, black or bloody stools  : negative for frequency, dysuria, hematuria, vaginal discharge  MSK: negative for back pain, joint pain, muscle pain  Skin: negative for itching, rash, hives  Psych: negative for anxiety, depression, change in mood      Objective:  Visit Vitals  /60   Ht 5' 2\" (1.575 m)   Wt 127 lb (57.6 kg)   BMI 23.23 kg/m²       Physical Exam:   PHYSICAL EXAMINATION    Constitutional  · Appearance: well-nourished, well developed, alert, in no acute distress    Gastrointestinal  · Abdominal Examination: abdomen non-tender to palpation, normal bowel sounds, no masses present  · Liver and spleen: no hepatomegaly present, spleen not palpable  · Hernias: no hernias identified    Genitourinary  · External Genitalia: normal appearance for age, no discharge present, no tenderness present, no inflammatory lesions present, no masses present, no atrophy present  · Vagina: normal vaginal vault without central or paravaginal defects, no discharge present, no inflammatory lesions present, no masses present  · Bladder: non-tender to palpation  · Urethra: appears normal  · Cervix: normal with IUD string visible and appropriate length   · Uterus:  · Adnexa:  · Perineum: perineum within normal limits, no evidence of trauma, no rashes or skin lesions present    Skin  · General Inspection: no rash, no lesions identified    Neurologic/Psychiatric  · Mental Status:  · Orientation: grossly oriented to person, place and time  · Mood and Affect: mood normal, affect appropriate    Assessment:   S/p IUD placement with normal US and exam  Likely BV(she c/o vaginal odor after her exam)    Plan: Flagyl 500 BID for 7 days

## 2022-03-19 PROBLEM — Z86.19 HEPATITIS C VIRUS INFECTION CURED AFTER ANTIVIRAL DRUG THERAPY: Status: ACTIVE | Noted: 2019-03-28

## 2022-12-16 ENCOUNTER — APPOINTMENT (OUTPATIENT)
Dept: GENERAL RADIOLOGY | Age: 32
End: 2022-12-16
Attending: PHYSICIAN ASSISTANT
Payer: COMMERCIAL

## 2022-12-16 VITALS
HEIGHT: 61 IN | HEART RATE: 70 BPM | SYSTOLIC BLOOD PRESSURE: 99 MMHG | BODY MASS INDEX: 22.81 KG/M2 | OXYGEN SATURATION: 99 % | DIASTOLIC BLOOD PRESSURE: 76 MMHG | TEMPERATURE: 98.2 F | RESPIRATION RATE: 16 BRPM | WEIGHT: 120.81 LBS

## 2022-12-16 PROCEDURE — 71046 X-RAY EXAM CHEST 2 VIEWS: CPT

## 2022-12-16 PROCEDURE — 99283 EMERGENCY DEPT VISIT LOW MDM: CPT

## 2022-12-17 ENCOUNTER — HOSPITAL ENCOUNTER (EMERGENCY)
Age: 32
Discharge: HOME | End: 2022-12-17
Attending: EMERGENCY MEDICINE
Payer: COMMERCIAL

## 2022-12-17 DIAGNOSIS — J20.9 ACUTE BRONCHITIS, UNSPECIFIED ORGANISM: Primary | ICD-10-CM

## 2022-12-17 RX ORDER — DOXYCYCLINE HYCLATE 100 MG
100 TABLET ORAL 2 TIMES DAILY
Qty: 14 TABLET | Refills: 0 | Status: SHIPPED | OUTPATIENT
Start: 2022-12-17 | End: 2022-12-24

## 2022-12-17 RX ORDER — PREDNISONE 20 MG/1
40 TABLET ORAL DAILY
Qty: 10 TABLET | Refills: 0 | Status: SHIPPED | OUTPATIENT
Start: 2022-12-17 | End: 2022-12-22

## 2022-12-17 RX ORDER — BENZONATATE 100 MG/1
100 CAPSULE ORAL
Qty: 30 CAPSULE | Refills: 0 | Status: SHIPPED | OUTPATIENT
Start: 2022-12-17 | End: 2022-12-24

## 2022-12-17 NOTE — Clinical Note
[address]  [facility] EMERGENCY DEPT  [address] [facility] [address] [phone]    Work/School Note    Date: 12/16/2022    To Whom It May concern:    Jen Cuevas was seen and treated today in the emergency room by the following provider(s):  Attending Provider: Heather Joelscotty is excused from work/school on 12/17/22 and 12/18/22. She is medically clear to return to work/school on 12/19/2022.        Sincerely,          Ashok Klein, DO

## 2022-12-17 NOTE — ED PROVIDER NOTES
EMERGENCY DEPARTMENT HISTORY AND PHYSICAL EXAM      Date: 12/17/2022  Patient Name: Sharmila Sousa    Please note that this dictation was completed with [employer], the computer voice recognition software. Quite often unanticipated grammatical, syntax, homophones, and other interpretive errors are inadvertently transcribed by the computer software. Please disregard these errors. Please excuse any errors that have escaped final proofreading. History of Presenting Illness     Chief Complaint   Patient presents with    Cough     Patient presents to triage ambulatory complaining of coughing and chest congestion. Patient reports her mucus is thick and yellow. Patient reports a PMD of walking pneumonia in the past and assumes she has it again due to similar symptoms. History Provided By: Patient     HPI: Sharmila Sousa, 33 y.o. female, emergency department complaining of shortness of breath, cough. She states the cough is been going on for a few weeks. Its feeling like it is getting worse with production to thick white mucus. Patient denies any fever. Denies any unilateral leg pain or leg swelling. PCP: Lloyd Chand MD    No current facility-administered medications on file prior to encounter. Current Outpatient Medications on File Prior to Encounter   Medication Sig Dispense Refill    atomoxetine (STRATTERA) 18 mg capsule       gabapentin (NEURONTIN) 300 mg capsule          Past History     Past Medical History:  Past Medical History:   Diagnosis Date    Constipation     Encounter for insertion of mirena IUD 06/2021    GERD (gastroesophageal reflux disease)     Hepatitis C     Intravenous drug abuse complicating pregnancy ([facility] [address]     Heroin and Meth       Past Surgical History:  No past surgical history on file.     Family History:  Family History   Problem Relation Age of Onset    Colon Cancer Father        Social History:  Social History     Tobacco Use    Smoking status: Former    Smokeless tobacco: Never   Substance Use Topics    Alcohol use: No    Drug use: No     Types: Methamphetamines, Heroin       Allergies:  No Known Allergies      Review of Systems   Review of Systems   Constitutional:  Negative for chills and fever. HENT:  Negative for congestion and sore throat. Eyes:  Negative for visual disturbance. Respiratory:  Positive for cough and shortness of breath. Cardiovascular:  Negative for chest pain and leg swelling. Gastrointestinal:  Negative for abdominal pain, blood in stool, diarrhea and nausea. Endocrine: Negative for polyuria. Genitourinary:  Negative for dysuria, flank pain, vaginal bleeding and vaginal discharge. Musculoskeletal:  Negative for myalgias. Skin:  Negative for rash. Allergic/Immunologic: Negative for immunocompromised state. Neurological:  Negative for weakness and headaches. Psychiatric/Behavioral:  Negative for confusion. Physical Exam   Physical Exam  Vitals and nursing note reviewed. Constitutional:       Appearance: She is well-developed. HENT:      Head: Normocephalic and atraumatic. Eyes:      General:         Right eye: No discharge. Left eye: No discharge. Conjunctiva/sclera: Conjunctivae normal.      Pupils: Pupils are equal, round, and reactive to light. Neck:      Trachea: No tracheal deviation. Cardiovascular:      Rate and Rhythm: Normal rate and regular rhythm. Heart sounds: Normal heart sounds. No murmur heard. Pulmonary:      Effort: Pulmonary effort is normal. No respiratory distress. Breath sounds: Normal breath sounds. No wheezing or rales. Abdominal:      General: Bowel sounds are normal.      Palpations: Abdomen is soft. Tenderness: There is no abdominal tenderness. There is no guarding or rebound. Musculoskeletal:         General: No tenderness or deformity. Normal range of motion. Cervical back: Normal range of motion and neck supple.    Skin:     General: Skin is warm and dry. Findings: No erythema or rash. Neurological:      Mental Status: She is alert and oriented to person, place, and time. Psychiatric:         Behavior: Behavior normal.       Diagnostic Study Results     Labs -   No results found for this or any previous visit (from the past 12 hour(s)). Radiologic Studies -   XR CHEST PA LAT   Final Result      Normal PA and lateral chest views. CT Results  (Last 48 hours)      None          CXR Results  (Last 48 hours)                 12/16/22 2142  XR CHEST PA LAT Final result    Impression:      Normal PA and lateral chest views. Narrative:  EXAM: XR CHEST PA LAT       INDICATION: Productive cough       COMPARISON: None       TECHNIQUE: PA and lateral chest views       FINDINGS: The cardiac size is within normal limits. The pulmonary vasculature is   within normal limits. The lungs and pleural spaces are clear. The visualized bones and upper abdomen   are age-appropriate. Medical Decision Making   I am the first provider for this patient. I reviewed the vital signs, available nursing notes, past medical history, past surgical history, family history and social history. Vital Signs-Reviewed the patient's vital signs. No data found. Records Reviewed:   Nursing notes, Prior visits     Provider Notes (Medical Decision Making): Is well nontoxic, lungs are clear on exam, given the duration of symptoms will trial antibiotics. Patient needs follow-up with primary care to ensure improvement of symptoms. Disposition to home. Overall well nontoxic-appearing. No clinical suspicion for pulmonary embolus at this time based off the history and physical exam.    ED Course:   Initial assessment performed. The patients presenting problems have been discussed, and they are in agreement with the care plan formulated and outlined with them.   I have encouraged them to ask questions as they arise throughout their visit.         Critical Care Time:   None      Disposition:    DISCHARGE NOTE  Patients results have been reviewed with them. Patient and/or family have verbally conveyed their understanding and agreement of the patient's signs, symptoms, diagnosis, treatment and prognosis and additionally agree to follow up as recommended or return to the Emergency Room should their condition change or have any new concerns prior to their follow-up appointment. Patient verbally agrees with the care-plan and verbally conveys that all of their questions have been answered. Discharge instructions have also been provided to the patient with some educational information regarding their diagnosis as well a list of reasons why they would want to return to the ER prior to their follow-up appointment should their condition change. PLAN:  1. Discharge Medication List as of 12/17/2022 12:40 AM        START taking these medications    Details   doxycycline (VIBRA-TABS) 100 mg tablet Take 1 Tablet by mouth two (2) times a day for 7 days. , Normal, Disp-14 Tablet, R-0      predniSONE (DELTASONE) 20 mg tablet Take 2 Tablets by mouth daily for 5 days. With Breakfast, Normal, Disp-10 Tablet, R-0      benzonatate (Tessalon Perles) 100 mg capsule Take 1 Capsule by mouth three (3) times daily as needed for Cough for up to 7 days. , Normal, Disp-30 Capsule, R-0           CONTINUE these medications which have NOT CHANGED    Details   atomoxetine (STRATTERA) 18 mg capsule Historical Med      gabapentin (NEURONTIN) 300 mg capsule Historical Med           2.    Follow-up Information       Follow up With Specialties Details Why Epi Raya MD Obstetrics & Gynecology, Gynecology, Obstetrics Schedule an appointment as soon as possible for a visit   [address] [place] [address] [address] [facility] EMERGENCY DEPT Emergency Medicine  If symptoms worsen [address] [place] [id number]  [phone]            Return to ED if worse     Diagnosis     Clinical Impression:   1. Acute bronchitis, unspecified organism        Attestations:   This note was completed by Felicita Moura DO

## 2023-05-17 RX ORDER — GABAPENTIN 300 MG/1
CAPSULE ORAL
COMMUNITY
Start: 2021-05-30

## 2023-05-17 RX ORDER — ATOMOXETINE 18 MG/1
CAPSULE ORAL
COMMUNITY
Start: 2021-05-30

## 2023-10-10 ENCOUNTER — NURSE TRIAGE (OUTPATIENT)
Dept: OTHER | Facility: CLINIC | Age: 33
End: 2023-10-10

## 2023-10-10 NOTE — TELEPHONE ENCOUNTER
[facility]    Received call from [address] [facility] with [employer]. Subjective: Caller states \"The last 3 days it started with congestion cough, sore throat. Aching. Now it is just a cough and I feel it in my chest with a wheeze. \"     Current Symptoms: dry cough. Sore throat, improving. Onset: 3 days ago; unchanged    Associated Symptoms: body aches, improves throughout the day when moving around. Pain Severity: 0/10; ; back pain in the morning, states takes Gabapentin for that concern. Temperature: denies     What has been tried: inhaler, Ibuprofen    LMP:  IUD  Pregnant: No    Recommended disposition: Go to [facility] if unable to get appt with new provider go to urgent care or ED. Agreeable. Care advice provided, patient verbalizes understanding; denies any other questions or concerns; instructed to call back for any new or worsening symptoms. Patient/Caller agrees with recommended disposition; writer provided warm transfer to [employer] at [facility] for appointment scheduling    Attention Provider: Thank you for allowing me to participate in the care of your patient. The patient was connected to triage in response to information provided to the ECC/PSC. Please do not respond through this encounter as the response is not directed to a shared pool.         Reason for Disposition   Wheezing is present    Protocols used: Cough-ADULT-[place]

## 2023-11-28 ENCOUNTER — OFFICE VISIT (OUTPATIENT)
Age: 33
End: 2023-11-28
Payer: COMMERCIAL

## 2023-11-28 VITALS
WEIGHT: 128 LBS | BODY MASS INDEX: 24.19 KG/M2 | OXYGEN SATURATION: 99 % | DIASTOLIC BLOOD PRESSURE: 72 MMHG | RESPIRATION RATE: 18 BRPM | SYSTOLIC BLOOD PRESSURE: 106 MMHG | HEART RATE: 64 BPM

## 2023-11-28 DIAGNOSIS — D72.829 LEUKOCYTOSIS, UNSPECIFIED TYPE: Primary | ICD-10-CM

## 2023-11-28 DIAGNOSIS — D72.829 LEUKOCYTOSIS, UNSPECIFIED TYPE: ICD-10-CM

## 2023-11-28 LAB
BASOPHILS # BLD AUTO: 0.1 X10E3/UL (ref 0–0.2)
BASOPHILS NFR BLD AUTO: 1 %
EOSINOPHIL # BLD AUTO: 0.3 X10E3/UL (ref 0–0.4)
EOSINOPHIL NFR BLD AUTO: 3 %
ERYTHROCYTE [DISTWIDTH] IN BLOOD BY AUTOMATED COUNT: 12 % (ref 11.7–15.4)
HCT VFR BLD AUTO: 40.6 % (ref 34–46.6)
HGB BLD-MCNC: 13.7 G/DL (ref 11.1–15.9)
IMM GRANULOCYTES # BLD AUTO: 0.1 X10E3/UL (ref 0–0.1)
IMM GRANULOCYTES NFR BLD AUTO: 1 %
LYMPHOCYTES # BLD AUTO: 3 X10E3/UL (ref 0.7–3.1)
LYMPHOCYTES NFR BLD AUTO: 31 %
MCH RBC QN AUTO: 31.7 PG (ref 26.6–33)
MCHC RBC AUTO-ENTMCNC: 33.7 G/DL (ref 31.5–35.7)
MCV RBC AUTO: 94 FL (ref 79–97)
MONOCYTES # BLD AUTO: 0.7 X10E3/UL (ref 0.1–0.9)
MONOCYTES NFR BLD AUTO: 8 %
NEUTROPHILS # BLD AUTO: 5.4 X10E3/UL (ref 1.4–7)
NEUTROPHILS NFR BLD AUTO: 56 %
PLATELET # BLD AUTO: 284 X10E3/UL (ref 150–450)
RBC # BLD AUTO: 4.32 X10E6/UL (ref 3.77–5.28)
WBC # BLD AUTO: 9.6 X10E3/UL (ref 3.4–10.8)

## 2023-11-28 PROCEDURE — 99203 OFFICE O/P NEW LOW 30 MIN: CPT | Performed by: INTERNAL MEDICINE

## 2023-11-28 RX ORDER — DEXTROAMPHETAMINE SULFATE, DEXTROAMPHETAMINE SACCHARATE, AMPHETAMINE SULFATE AND AMPHETAMINE ASPARTATE 5; 5; 5; 5 MG/1; MG/1; MG/1; MG/1
CAPSULE, EXTENDED RELEASE ORAL
COMMUNITY
Start: 2023-11-21

## 2023-11-28 NOTE — PROGRESS NOTES
Ethan Bird is a 29 y.o. female     Chief Complaint   Patient presents with    Follow-up     evaluation of leukocytosis     1. Have you been to the ER, urgent care clinic since your last visit? Hospitalized since your last visit? No    2. Have you seen or consulted any other health care providers outside of the [address] [place] [address] since your last visit? Include any pap smears or colon screening.  No

## 2023-11-29 ENCOUNTER — TELEPHONE (OUTPATIENT)
Age: 33
End: 2023-11-29

## 2023-11-29 LAB — HIV 1+2 AB+HIV1 P24 AG SERPL QL IA: NON REACTIVE

## 2023-11-29 NOTE — TELEPHONE ENCOUNTER
[id number]:    Called patient per MD Jacinta Barber request to inform of the following:     Blood counts are completely normal. Her WBC was 9.6. Her HIV testing was negative.     No answer   LVM to call office back

## (undated) DEVICE — (D)PREP SKN CHLRAPRP APPL 26ML -- CONVERT TO ITEM 371833

## (undated) DEVICE — SOLUTION IV 1000ML 0.9% SOD CHL

## (undated) DEVICE — (D)GLOVE SURG PROTEITY 7.5 LTX -- SUB 302984

## (undated) DEVICE — SPONGE LAP 18X18IN STRL -- 5/PK

## (undated) DEVICE — SUTURE VCRL SZ 2-0 L36IN ABSRB UD L36MM CT-1 1/2 CIR J945H

## (undated) DEVICE — SUTURE PLN GUT SZ 3-0 L27IN ABSRB YELLOWISH TAN L40MM CT 852H

## (undated) DEVICE — TOWEL,OR,DSP,ST,BLUE,STD,2/PK,40PK/CS: Brand: MEDLINE

## (undated) DEVICE — REM POLYHESIVE ADULT PATIENT RETURN ELECTRODE: Brand: VALLEYLAB

## (undated) DEVICE — Z INACTIVE PER BARD TRAY CATH W/ 16FR DRNGE BG STATLOK F STBL DEV W/

## (undated) DEVICE — C-SECTION II-LF: Brand: MEDLINE INDUSTRIES, INC.

## (undated) DEVICE — POOLE SUCTION INSTRUMENT WITH REMOVABLE SHEATH: Brand: POOLE

## (undated) DEVICE — TIP CLEANER: Brand: VALLEYLAB

## (undated) DEVICE — SUTURE PDS II SZ 0 L60IN ABSRB VLT L48MM CTX 1/2 CIR Z990G

## (undated) DEVICE — SOLIDIFIER FLUID 3000 CC ABSORB

## (undated) DEVICE — TELFA ADHESIVE ISLAND DRESSING: Brand: TELFA

## (undated) DEVICE — STAPLER SKIN H3.9MM WIRE DIA0.58MM CRWN 6.9MM 35 STPL ROT

## (undated) DEVICE — BLADE SURG UNIV BLUE --

## (undated) DEVICE — SLEEVE COMPR STD 12 IN FOR 165IN CALF COMFORT VENODYNE SYS

## (undated) DEVICE — AGENT HEMSTAT 3GM PURIFIED PLNT STARCH PWD ABSRB ARISTA AH

## (undated) DEVICE — ROCKER SWITCH PENCIL HOLSTER: Brand: VALLEYLAB

## (undated) DEVICE — BULB SYRINGE, IRRIGATION WITH PROTECTIVE CAP, 60 CC, INDIVIDUALLY WRAPPED: Brand: DOVER

## (undated) DEVICE — SUTURE VCRL SZ 0 L36IN ABSRB VLT L40MM CT 1/2 CIR J358H